# Patient Record
Sex: MALE | Race: WHITE | Employment: UNEMPLOYED | URBAN - METROPOLITAN AREA
[De-identification: names, ages, dates, MRNs, and addresses within clinical notes are randomized per-mention and may not be internally consistent; named-entity substitution may affect disease eponyms.]

---

## 2021-01-10 ENCOUNTER — HOSPITAL ENCOUNTER (EMERGENCY)
Dept: CT IMAGING | Age: 66
Discharge: HOME | DRG: 208 | End: 2021-01-10
Attending: EMERGENCY MEDICINE
Payer: MEDICARE

## 2021-01-10 ENCOUNTER — APPOINTMENT (OUTPATIENT)
Dept: GENERAL RADIOLOGY | Age: 66
DRG: 208 | End: 2021-01-10
Attending: EMERGENCY MEDICINE
Payer: MEDICARE

## 2021-01-10 ENCOUNTER — HOSPITAL ENCOUNTER (INPATIENT)
Age: 66
LOS: 3 days | Discharge: NURSING, REHAB OR LONG-TERM CARE | DRG: 208 | End: 2021-01-13
Attending: EMERGENCY MEDICINE | Admitting: INTERNAL MEDICINE
Payer: MEDICARE

## 2021-01-10 DIAGNOSIS — J96.02 ACUTE RESPIRATORY FAILURE WITH HYPERCAPNIA (HCC): ICD-10-CM

## 2021-01-10 DIAGNOSIS — G93.40 ACUTE ENCEPHALOPATHY: ICD-10-CM

## 2021-01-10 DIAGNOSIS — A41.9 SEPSIS, DUE TO UNSPECIFIED ORGANISM, UNSPECIFIED WHETHER ACUTE ORGAN DYSFUNCTION PRESENT (HCC): ICD-10-CM

## 2021-01-10 DIAGNOSIS — N30.00 ACUTE CYSTITIS WITHOUT HEMATURIA: Primary | ICD-10-CM

## 2021-01-10 LAB
ALBUMIN SERPL-MCNC: 3.2 G/DL (ref 3.5–5)
ALBUMIN/GLOB SERPL: 0.7 {RATIO} (ref 1.1–2.2)
ALP SERPL-CCNC: 110 U/L (ref 45–117)
ALT SERPL-CCNC: 26 U/L (ref 12–78)
ANION GAP SERPL CALC-SCNC: 5 MMOL/L (ref 5–15)
APPEARANCE UR: ABNORMAL
ARTERIAL PATENCY WRIST A: YES
AST SERPL-CCNC: 23 U/L (ref 15–37)
ATRIAL RATE: 102 BPM
BACTERIA URNS QL MICRO: ABNORMAL /HPF
BASE DEFICIT BLD-SCNC: 4 MMOL/L
BASOPHILS # BLD: 0 K/UL (ref 0–0.1)
BASOPHILS NFR BLD: 0 % (ref 0–1)
BDY SITE: ABNORMAL
BILIRUB SERPL-MCNC: 0.6 MG/DL (ref 0.2–1)
BILIRUB UR QL CFM: NEGATIVE
BUN SERPL-MCNC: 53 MG/DL (ref 6–20)
BUN/CREAT SERPL: 12 (ref 12–20)
CA-I BLD-SCNC: 1.39 MMOL/L (ref 1.12–1.32)
CALCIUM SERPL-MCNC: 9.7 MG/DL (ref 8.5–10.1)
CALCULATED R AXIS, ECG10: 3 DEGREES
CALCULATED T AXIS, ECG11: 137 DEGREES
CHLORIDE SERPL-SCNC: 97 MMOL/L (ref 97–108)
CO2 SERPL-SCNC: 26 MMOL/L (ref 21–32)
COLOR UR: ABNORMAL
COMMENT, HOLDF: NORMAL
COMMENT, HOLDF: NORMAL
COVID-19 RAPID TEST, COVR: NOT DETECTED
CREAT SERPL-MCNC: 4.48 MG/DL (ref 0.7–1.3)
DIAGNOSIS, 93000: NORMAL
DIFFERENTIAL METHOD BLD: ABNORMAL
EOSINOPHIL # BLD: 0 K/UL (ref 0–0.4)
EOSINOPHIL NFR BLD: 0 % (ref 0–7)
EPITH CASTS URNS QL MICRO: ABNORMAL /LPF
ERYTHROCYTE [DISTWIDTH] IN BLOOD BY AUTOMATED COUNT: 18.9 % (ref 11.5–14.5)
GAS FLOW.O2 O2 DELIVERY SYS: ABNORMAL L/MIN
GLOBULIN SER CALC-MCNC: 4.6 G/DL (ref 2–4)
GLUCOSE SERPL-MCNC: 281 MG/DL (ref 65–100)
GLUCOSE UR STRIP.AUTO-MCNC: NEGATIVE MG/DL
HCO3 BLD-SCNC: 23.5 MMOL/L (ref 22–26)
HCT VFR BLD AUTO: 29.5 % (ref 36.6–50.3)
HEALTH STATUS, XMCV2T: NORMAL
HGB BLD-MCNC: 8.7 G/DL (ref 12.1–17)
HGB UR QL STRIP: ABNORMAL
IMM GRANULOCYTES # BLD AUTO: 0.1 K/UL (ref 0–0.04)
IMM GRANULOCYTES NFR BLD AUTO: 1 % (ref 0–0.5)
KETONES UR QL STRIP.AUTO: NEGATIVE MG/DL
LACTATE SERPL-SCNC: 1.6 MMOL/L (ref 0.4–2)
LEUKOCYTE ESTERASE UR QL STRIP.AUTO: ABNORMAL
LYMPHOCYTES # BLD: 0.3 K/UL (ref 0.8–3.5)
LYMPHOCYTES NFR BLD: 2 % (ref 12–49)
MCH RBC QN AUTO: 26.7 PG (ref 26–34)
MCHC RBC AUTO-ENTMCNC: 29.5 G/DL (ref 30–36.5)
MCV RBC AUTO: 90.5 FL (ref 80–99)
MONOCYTES # BLD: 0.5 K/UL (ref 0–1)
MONOCYTES NFR BLD: 4 % (ref 5–13)
NEUTS SEG # BLD: 12.1 K/UL (ref 1.8–8)
NEUTS SEG NFR BLD: 93 % (ref 32–75)
NITRITE UR QL STRIP.AUTO: NEGATIVE
NRBC # BLD: 0.03 K/UL (ref 0–0.01)
NRBC BLD-RTO: 0.2 PER 100 WBC
O2/TOTAL GAS SETTING VFR VENT: 40 %
PCO2 BLD: 55.8 MMHG (ref 35–45)
PH BLD: 7.23 [PH] (ref 7.35–7.45)
PH UR STRIP: 5 [PH] (ref 5–8)
PLATELET # BLD AUTO: 222 K/UL (ref 150–400)
PMV BLD AUTO: 11.2 FL (ref 8.9–12.9)
PO2 BLD: 110 MMHG (ref 80–100)
POTASSIUM SERPL-SCNC: 5.1 MMOL/L (ref 3.5–5.1)
PROT SERPL-MCNC: 7.8 G/DL (ref 6.4–8.2)
PROT UR STRIP-MCNC: 300 MG/DL
Q-T INTERVAL, ECG07: 346 MS
QRS DURATION, ECG06: 120 MS
QTC CALCULATION (BEZET), ECG08: 454 MS
RBC # BLD AUTO: 3.26 M/UL (ref 4.1–5.7)
RBC #/AREA URNS HPF: ABNORMAL /HPF (ref 0–5)
RBC MORPH BLD: ABNORMAL
SAMPLES BEING HELD,HOLD: NORMAL
SAMPLES BEING HELD,HOLD: NORMAL
SAO2 % BLD: 97 % (ref 92–97)
SODIUM SERPL-SCNC: 128 MMOL/L (ref 136–145)
SOURCE, COVRS: NORMAL
SP GR UR REFRACTOMETRY: 1.02 (ref 1–1.03)
SPECIMEN SOURCE, FCOV2M: NORMAL
SPECIMEN TYPE, XMCV1T: NORMAL
SPECIMEN TYPE: ABNORMAL
TROPONIN I SERPL-MCNC: <0.05 NG/ML
UROBILINOGEN UR QL STRIP.AUTO: 0.2 EU/DL (ref 0.2–1)
VENTRICULAR RATE, ECG03: 104 BPM
WBC # BLD AUTO: 13 K/UL (ref 4.1–11.1)
WBC MORPH BLD: ABNORMAL
WBC URNS QL MICRO: ABNORMAL /HPF (ref 0–4)
YEAST BUDDING URNS QL: PRESENT

## 2021-01-10 PROCEDURE — 65610000006 HC RM INTENSIVE CARE

## 2021-01-10 PROCEDURE — 74176 CT ABD & PELVIS W/O CONTRAST: CPT

## 2021-01-10 PROCEDURE — 74011250636 HC RX REV CODE- 250/636: Performed by: INTERNAL MEDICINE

## 2021-01-10 PROCEDURE — 83605 ASSAY OF LACTIC ACID: CPT

## 2021-01-10 PROCEDURE — 80053 COMPREHEN METABOLIC PANEL: CPT

## 2021-01-10 PROCEDURE — 94002 VENT MGMT INPAT INIT DAY: CPT

## 2021-01-10 PROCEDURE — 87086 URINE CULTURE/COLONY COUNT: CPT

## 2021-01-10 PROCEDURE — 74011250637 HC RX REV CODE- 250/637: Performed by: INTERNAL MEDICINE

## 2021-01-10 PROCEDURE — 71250 CT THORAX DX C-: CPT

## 2021-01-10 PROCEDURE — 87506 IADNA-DNA/RNA PROBE TQ 6-11: CPT

## 2021-01-10 PROCEDURE — 84484 ASSAY OF TROPONIN QUANT: CPT

## 2021-01-10 PROCEDURE — 96368 THER/DIAG CONCURRENT INF: CPT

## 2021-01-10 PROCEDURE — 71045 X-RAY EXAM CHEST 1 VIEW: CPT

## 2021-01-10 PROCEDURE — 77030020291 HC FLEXSEAL FMS BMS -C

## 2021-01-10 PROCEDURE — 96365 THER/PROPH/DIAG IV INF INIT: CPT

## 2021-01-10 PROCEDURE — 87177 OVA AND PARASITES SMEARS: CPT

## 2021-01-10 PROCEDURE — 99285 EMERGENCY DEPT VISIT HI MDM: CPT

## 2021-01-10 PROCEDURE — 93005 ELECTROCARDIOGRAM TRACING: CPT

## 2021-01-10 PROCEDURE — 87324 CLOSTRIDIUM AG IA: CPT

## 2021-01-10 PROCEDURE — 74011250637 HC RX REV CODE- 250/637: Performed by: EMERGENCY MEDICINE

## 2021-01-10 PROCEDURE — 87106 FUNGI IDENTIFICATION YEAST: CPT

## 2021-01-10 PROCEDURE — 87635 SARS-COV-2 COVID-19 AMP PRB: CPT

## 2021-01-10 PROCEDURE — 77030037878 HC DRSG MEPILEX >48IN BORD MOLN -B

## 2021-01-10 PROCEDURE — 74011000258 HC RX REV CODE- 258: Performed by: EMERGENCY MEDICINE

## 2021-01-10 PROCEDURE — 5A1945Z RESPIRATORY VENTILATION, 24-96 CONSECUTIVE HOURS: ICD-10-PCS | Performed by: INTERNAL MEDICINE

## 2021-01-10 PROCEDURE — 31502 CHANGE OF WINDPIPE AIRWAY: CPT

## 2021-01-10 PROCEDURE — 70450 CT HEAD/BRAIN W/O DYE: CPT

## 2021-01-10 PROCEDURE — 82803 BLOOD GASES ANY COMBINATION: CPT

## 2021-01-10 PROCEDURE — 87040 BLOOD CULTURE FOR BACTERIA: CPT

## 2021-01-10 PROCEDURE — 85025 COMPLETE CBC W/AUTO DIFF WBC: CPT

## 2021-01-10 PROCEDURE — 74011250636 HC RX REV CODE- 250/636: Performed by: EMERGENCY MEDICINE

## 2021-01-10 PROCEDURE — 36600 WITHDRAWAL OF ARTERIAL BLOOD: CPT

## 2021-01-10 PROCEDURE — 36415 COLL VENOUS BLD VENIPUNCTURE: CPT

## 2021-01-10 PROCEDURE — 81001 URINALYSIS AUTO W/SCOPE: CPT

## 2021-01-10 RX ORDER — IPRATROPIUM BROMIDE AND ALBUTEROL SULFATE 2.5; .5 MG/3ML; MG/3ML
3 SOLUTION RESPIRATORY (INHALATION) 2 TIMES DAILY
COMMUNITY

## 2021-01-10 RX ORDER — PROMETHAZINE HYDROCHLORIDE 25 MG/1
12.5 TABLET ORAL
Status: DISCONTINUED | OUTPATIENT
Start: 2021-01-10 | End: 2021-01-13 | Stop reason: HOSPADM

## 2021-01-10 RX ORDER — ATORVASTATIN CALCIUM 40 MG/1
40 TABLET, FILM COATED ORAL
COMMUNITY

## 2021-01-10 RX ORDER — CHLORHEXIDINE GLUCONATE 1.2 MG/ML
15 RINSE ORAL
COMMUNITY

## 2021-01-10 RX ORDER — METOPROLOL TARTRATE 100 MG/1
100 TABLET ORAL 2 TIMES DAILY
COMMUNITY

## 2021-01-10 RX ORDER — FLUCONAZOLE 100 MG/1
100 TABLET ORAL DAILY
COMMUNITY

## 2021-01-10 RX ORDER — MELATONIN
2000 DAILY
COMMUNITY

## 2021-01-10 RX ORDER — SODIUM CHLORIDE 0.9 % (FLUSH) 0.9 %
5-40 SYRINGE (ML) INJECTION EVERY 8 HOURS
Status: DISCONTINUED | OUTPATIENT
Start: 2021-01-10 | End: 2021-01-13 | Stop reason: HOSPADM

## 2021-01-10 RX ORDER — B COMPLEX C NO.10/FOLIC ACID 900MCG/5ML
5 LIQUID (ML) ORAL
COMMUNITY

## 2021-01-10 RX ORDER — ACETAMINOPHEN 325 MG/1
650 TABLET ORAL
COMMUNITY

## 2021-01-10 RX ORDER — DARBEPOETIN ALFA 60 UG/.3ML
60 INJECTION, SOLUTION INTRAVENOUS; SUBCUTANEOUS
COMMUNITY

## 2021-01-10 RX ORDER — ALBUTEROL SULFATE 0.83 MG/ML
2.5 SOLUTION RESPIRATORY (INHALATION)
COMMUNITY

## 2021-01-10 RX ORDER — SODIUM CHLORIDE, SODIUM LACTATE, POTASSIUM CHLORIDE, CALCIUM CHLORIDE 600; 310; 30; 20 MG/100ML; MG/100ML; MG/100ML; MG/100ML
75 INJECTION, SOLUTION INTRAVENOUS CONTINUOUS
Status: DISCONTINUED | OUTPATIENT
Start: 2021-01-10 | End: 2021-01-13

## 2021-01-10 RX ORDER — ENOXAPARIN SODIUM 100 MG/ML
30 INJECTION SUBCUTANEOUS DAILY
Status: DISCONTINUED | OUTPATIENT
Start: 2021-01-11 | End: 2021-01-12

## 2021-01-10 RX ORDER — LEVOFLOXACIN 5 MG/ML
750 INJECTION, SOLUTION INTRAVENOUS ONCE
Status: COMPLETED | OUTPATIENT
Start: 2021-01-10 | End: 2021-01-10

## 2021-01-10 RX ORDER — GABAPENTIN 100 MG/1
100 CAPSULE ORAL 3 TIMES DAILY
COMMUNITY

## 2021-01-10 RX ORDER — ACETAMINOPHEN 650 MG/1
SUPPOSITORY RECTAL
Status: DISPENSED
Start: 2021-01-10 | End: 2021-01-11

## 2021-01-10 RX ORDER — ESCITALOPRAM OXALATE 10 MG/1
10 TABLET ORAL DAILY
COMMUNITY

## 2021-01-10 RX ORDER — AMIODARONE HYDROCHLORIDE 200 MG/1
200 TABLET ORAL 2 TIMES DAILY
COMMUNITY

## 2021-01-10 RX ORDER — INSULIN GLARGINE 100 [IU]/ML
30 INJECTION, SOLUTION SUBCUTANEOUS EVERY 12 HOURS
COMMUNITY

## 2021-01-10 RX ORDER — ONDANSETRON 2 MG/ML
4 INJECTION INTRAMUSCULAR; INTRAVENOUS
Status: DISCONTINUED | OUTPATIENT
Start: 2021-01-10 | End: 2021-01-13 | Stop reason: HOSPADM

## 2021-01-10 RX ORDER — PANTOPRAZOLE SODIUM 40 MG/1
40 TABLET, DELAYED RELEASE ORAL DAILY
COMMUNITY

## 2021-01-10 RX ORDER — ACETAMINOPHEN 325 MG/1
650 TABLET ORAL
Status: DISCONTINUED | OUTPATIENT
Start: 2021-01-10 | End: 2021-01-13 | Stop reason: HOSPADM

## 2021-01-10 RX ORDER — ACETAMINOPHEN 650 MG/1
650 SUPPOSITORY RECTAL
Status: COMPLETED | OUTPATIENT
Start: 2021-01-10 | End: 2021-01-10

## 2021-01-10 RX ORDER — POLYETHYLENE GLYCOL 3350 17 G/17G
17 POWDER, FOR SOLUTION ORAL DAILY PRN
Status: DISCONTINUED | OUTPATIENT
Start: 2021-01-10 | End: 2021-01-13 | Stop reason: HOSPADM

## 2021-01-10 RX ORDER — MIDODRINE HYDROCHLORIDE 10 MG/1
TABLET ORAL
COMMUNITY

## 2021-01-10 RX ORDER — SODIUM CHLORIDE 0.9 % (FLUSH) 0.9 %
5-40 SYRINGE (ML) INJECTION AS NEEDED
Status: DISCONTINUED | OUTPATIENT
Start: 2021-01-10 | End: 2021-01-13 | Stop reason: HOSPADM

## 2021-01-10 RX ORDER — CLONAZEPAM 0.5 MG/1
0.5 TABLET ORAL EVERY 8 HOURS
COMMUNITY

## 2021-01-10 RX ORDER — ACETAMINOPHEN 650 MG/1
650 SUPPOSITORY RECTAL
Status: DISCONTINUED | OUTPATIENT
Start: 2021-01-10 | End: 2021-01-13 | Stop reason: HOSPADM

## 2021-01-10 RX ORDER — INSULIN LISPRO 100 [IU]/ML
1 INJECTION, SOLUTION INTRAVENOUS; SUBCUTANEOUS
COMMUNITY

## 2021-01-10 RX ADMIN — SODIUM CHLORIDE 1000 ML: 9 INJECTION, SOLUTION INTRAVENOUS at 08:15

## 2021-01-10 RX ADMIN — ACETAMINOPHEN 650 MG: 650 SUPPOSITORY RECTAL at 12:26

## 2021-01-10 RX ADMIN — ACETAMINOPHEN 650 MG: 650 SUPPOSITORY RECTAL at 18:29

## 2021-01-10 RX ADMIN — CEFEPIME HYDROCHLORIDE 2 G: 2 INJECTION, POWDER, FOR SOLUTION INTRAVENOUS at 10:23

## 2021-01-10 RX ADMIN — LEVOFLOXACIN 750 MG: 5 INJECTION, SOLUTION INTRAVENOUS at 12:09

## 2021-01-10 RX ADMIN — SODIUM CHLORIDE 1000 ML: 9 INJECTION, SOLUTION INTRAVENOUS at 12:26

## 2021-01-10 RX ADMIN — SODIUM CHLORIDE, POTASSIUM CHLORIDE, SODIUM LACTATE AND CALCIUM CHLORIDE 75 ML/HR: 600; 310; 30; 20 INJECTION, SOLUTION INTRAVENOUS at 15:08

## 2021-01-10 RX ADMIN — Medication 10 ML: at 15:08

## 2021-01-10 NOTE — PROGRESS NOTES
Admission Medication Reconciliation:    Information obtained from: chart review, medical records from [address] [place] [address]:  YES- not used     Comments/Recommendations: All medications/allergies have been reviewed and updated; all medications were reviewed from medical records sent from [address] [place] [address]. Medication active from 21 were included in medication list.     Changes made to Prior to Admission (PTA) Medication List:   ?   Medications Added:   - All current medication on PTA medication list   ?   Medications Changed:   - None   ? Medications Removed:   - None     ¹RxQuery pharmacy benefit data reflects medications filled and processed through the patient's insurance, however   this data does NOT capture whether the medication was picked up or is currently being taken by the patient. Allergies:  Codeine and Penciclovir    Significant PMH/Disease States:   History reviewed. No pertinent past medical history. Chief Complaint for this Admission:    Chief Complaint   Patient presents with    Unresponsive       Prior to Admission Medications:   Prior to Admission Medications   Prescriptions Last Dose Informant Patient Reported? Taking?   acetaminophen (TYLENOL) 325 mg tablet   Yes Yes   Sig: Take 650 mg by mouth every six (6) hours as needed for Pain. albuterol (PROVENTIL VENTOLIN) 2.5 mg /3 mL (0.083 %) nebu   Yes Yes   Si.5 mg by Nebulization route every six (6) hours as needed for Wheezing. albuterol-ipratropium (DUO-NEB) 2.5 mg-0.5 mg/3 ml nebu   Yes Yes   Sig: 3 mL by Nebulization route two (2) times a day. amiodarone (CORDARONE) 200 mg tablet   Yes Yes   Sig: Take 200 mg by mouth two (2) times a day. apixaban (ELIQUIS) 5 mg tablet   Yes Yes   Sig: Take 5 mg by mouth two (2) times a day. atorvastatin (LIPITOR) 40 mg tablet   Yes Yes   Sig: Take 40 mg by mouth nightly. b complex-vitamin c-folic acid (Nephronex) 900 mcg/5 mL liqd   Yes Yes   Sig: Take 5 mL by mouth nightly. chlorhexidine (Paroex Oral Rinse) 0.12 % solution   Yes Yes   Sig: 15 mL by Swish and Spit route two (2) times daily as needed. cholecalciferol (Vitamin D3) (1000 Units /25 mcg) tablet   Yes Yes   Sig: Take 2,000 Units by mouth daily. clonazePAM (KlonoPIN) 0.5 mg tablet   Yes Yes   Sig: Take 0.5 mg by mouth every eight (8) hours. darbepoetin chucho (Aranesp, Polysorbate,) 60 mcg/0.3 mL injection   Yes Yes   Si mcg by SubCUTAneous route Every Thursday. escitalopram oxalate (Lexapro) 10 mg tablet   Yes Yes   Sig: Take 10 mg by mouth daily. fluconazole (Diflucan) 100 mg tablet   Yes Yes   Sig: Take 100 mg by mouth daily. FDA advises cautious prescribing of oral fluconazole in pregnancy. gabapentin (NEURONTIN) 100 mg capsule   Yes Yes   Sig: Take 100 mg by mouth three (3) times daily. insulin glargine (Lantus U-100 Insulin) 100 unit/mL injection   Yes Yes   Si Units by SubCUTAneous route every twelve (12) hours. insulin lispro (HumaLOG U-100 Insulin) 100 unit/mL injection   Yes Yes   Si Units by SubCUTAneous route three (3) times daily as needed (meals). metoprolol tartrate (Lopressor) 100 mg IR tablet   Yes Yes   Sig: Take 100 mg by mouth two (2) times a day. midodrine (PROAMATINE) 10 mg tablet   Yes Yes   Sig: Take  by mouth Every Tuesday, Thursday and Saturday. pantoprazole (Protonix) 40 mg tablet   Yes Yes   Sig: Take 40 mg by mouth daily. polyvinyl alcohol-povidon,PF, (REFRESH CLASSIC) 1.4-0.6 % ophthalmic solution   Yes Yes   Sig: Administer 1 Drop to both eyes every six (6) hours as needed. Facility-Administered Medications: None         Thank you for allowing pharmacy to participate in the coordination of this patient's care. If you have any other questions, please contact the medication reconciliation pharmacist at [phone]. Anibal Walters D., [facility]

## 2021-01-10 NOTE — PROGRESS NOTES
Day #1 of cefepime  Indication:  UTI  Current regimen:  1g IV Q12h  Abx regimen: cefepime  Recent Labs     01/10/21  0717   WBC 13.0*   CREA 4.48*   BUN 53*     Est CrCl: ~18 ml/min  Temp (24hrs), Av.5 °F (39.2 °C), Min:101.9 °F (38.8 °C), Max:103 °F (39.4 °C)    Cultures: 1/10 blood - pending    Plan: Change to cefepime 1g IV Q24h per renal dose adjustment policy.

## 2021-01-10 NOTE — ED TRIAGE NOTES
EMS reports the pt was sent from [facility] for low SPO2 sat (in the low 80s). EMS reports the pt is normally vented and when they suctioned him his sats came up. Leslie North reports after suctioning the pt became unresponsive. EMS reports the pt normally does not talk but does respond. EMS reports they did not put the pt back on the vent because he was breathing on his own and sating fine.

## 2021-01-10 NOTE — H&P
[facility]    ICU Team H&P     Name: Jesus Paige   : 1955   MRN: [id number]   Date: 1/10/2021      A/P   1. UTI with sepsis    A. Cefepime 2gm q12 d/c FQ   B narrow when cx returned     2. DMII - SSI     3. CKI IV with LYDIA    A. Trend CR     4. Chronic resp failure - baseline     5. Afib - get med list from [address] [place] [address] and restart meds as needed     6. PUI per hospital policy - no medical indication     POD:* No surgery found *      Problem List  Never Reviewed          Codes Class    Sepsis ([facility] [address] ICD-10-CM: A41.9  ICD-9-CM: 038.9, 995.91               Past Medical History:   Covid PNA with trach at [address] [place] [address]   CKD IV   DMII  Afib  Debilitatin   Anoxic brain injury - chronic - able to communicate needs     Past Surgical History:      has no past surgical history on file. Home Medications:     Prior to Admission medications    Not on File       Allergies/Social/Family History:     No Known Allergies   Social History     Tobacco Use    Smoking status: Not on file   Substance Use Topics    Alcohol use: Not on file      History reviewed. No pertinent family history. Review of Systems:     Unable     Objective:   Vital Signs:  Visit Vitals  /63 (BP 1 Location: Left arm, BP Patient Position: Head of bed elevated (Comment degrees))   Pulse (!) 113   Temp (!) 102.9 °F (39.4 °C)   Resp 20   Wt 82.5 kg (181 lb 14.1 oz)   SpO2 99%      O2 Device: Tracheostomy, Ventilator Temp (24hrs), Av °F (39.4 °C), Min:102.9 °F (39.4 °C), Max:103 °F (39.4 °C)           Intake/Output:   No intake or output data in the 24 hours ending 01/10/21 1245    Physical Exam:    General:  Opens eyes tracks does not follow commands - better than he was    Eyes:  Sclera anicteric. Pupils equal, round, reactive to light. Mouth/Throat: Orotracheal tube in place. Neck: Supple. Lungs:   Clear to auscultation bilaterally, good effort. Cardiovascular:  Regular rate and rhythm, no murmur, click, rub, or gallop.    Abdomen: Soft, non-tender, bowel sounds normal, non-distended. Extremities: No cyanosis or edema. Skin: No acute rash or lesions. Lymph Nodes: Cervical and supraclavicular normal.   Musculoskeletal:  No swelling or deformity. Lines/Devices:  Intact, no erythema, drainage, or tenderness. LABS AND  DATA: Personally reviewed  Recent Labs     01/10/21  0717   WBC 13.0*   HGB 8.7*   HCT 29.5*        Recent Labs     01/10/21  0717   *   K 5.1   CL 97   CO2 26   BUN 53*   CREA 4.48*   *   CA 9.7     Recent Labs     01/10/21  0717      TP 7.8   ALB 3.2*   GLOB 4.6*     No results for input(s): INR, PTP, APTT, INREXT in the last 72 hours. Recent Labs     01/10/21  0726   PHI 7.23*   PCO2I 55.8*   PO2I 110*   FIO2I 40     Recent Labs     01/10/21  0717   TROIQ <0.05       Hemodynamics:   PAP:   CO:     Wedge:   CI:     CVP:    SVR:       PVR:       Ventilator Settings:  Mode Rate Tidal Volume Pressure FiO2 PEEP   Assist control   500 ml    40 % 5 cm H20     Peak airway pressure: 30 cm H2O    Minute ventilation: 10.3 l/min        MEDS: Reviewed    Chest X-Ray: personally reviewed and report checked      DISPOSITION  Stay in ICU    CRITICAL CARE     I personally spent 65 minutes of critical care time. This is time spent at this critically ill patient's bedside actively involved in patient care as well as the coordination of care and discussions with the patient's family. This does not include any procedural time which has been billed separately.     [address] [place] [address]  1/10/2021

## 2021-01-10 NOTE — PROGRESS NOTES
Renal Dosing/Monitoring  Medication: famotidine   Current regimen:  20 mg IV every 12 hr  Recent Labs     01/10/21  0717   CREA 4.48*   BUN 53*     Estimated CrCl:  ~18 ml/min  Plan: Change to famotidine 20 mg IV P11P  per policy.

## 2021-01-10 NOTE — ED NOTES
Pts wife, Amberly Sy, [phone]. Pts wife states [facility] informed her that pt spiked a fever and had retained too much CO2 which is why he was sent to the hospital today. She reports the pt is usually responsive, has a hx of stage 4 renal disease, diabetes, and afib. Pts wife states they informed her that the pt had a UTI on Wednesday and pts wife states the pt looked unwell since Friday.

## 2021-01-11 LAB
ALBUMIN SERPL-MCNC: 2.6 G/DL (ref 3.5–5)
ALBUMIN/GLOB SERPL: 0.7 {RATIO} (ref 1.1–2.2)
ALP SERPL-CCNC: 87 U/L (ref 45–117)
ALT SERPL-CCNC: 31 U/L (ref 12–78)
ANION GAP SERPL CALC-SCNC: 8 MMOL/L (ref 5–15)
ARTERIAL PATENCY WRIST A: NO
AST SERPL-CCNC: 38 U/L (ref 15–37)
BASE DEFICIT BLD-SCNC: 6 MMOL/L
BASOPHILS # BLD: 0 K/UL (ref 0–0.1)
BASOPHILS NFR BLD: 0 % (ref 0–1)
BDY SITE: ABNORMAL
BILIRUB SERPL-MCNC: 0.5 MG/DL (ref 0.2–1)
BUN SERPL-MCNC: 76 MG/DL (ref 6–20)
BUN/CREAT SERPL: 14 (ref 12–20)
C DIFF GDH STL QL: NEGATIVE
C DIFF TOX A+B STL QL IA: NEGATIVE
C DIFF TOX GENS STL QL NAA+PROBE: NORMAL
CA-I BLD-SCNC: 1.34 MMOL/L (ref 1.12–1.32)
CALCIUM SERPL-MCNC: 9.3 MG/DL (ref 8.5–10.1)
CAMPYLOBACTER SPECIES, DNA: NEGATIVE
CHLORIDE SERPL-SCNC: 103 MMOL/L (ref 97–108)
CO2 SERPL-SCNC: 22 MMOL/L (ref 21–32)
COMMENT, HOLDF: NORMAL
CREAT SERPL-MCNC: 5.29 MG/DL (ref 0.7–1.3)
DIFFERENTIAL METHOD BLD: ABNORMAL
ENTEROTOXIGEN E COLI, DNA: NEGATIVE
EOSINOPHIL # BLD: 0 K/UL (ref 0–0.4)
EOSINOPHIL NFR BLD: 0 % (ref 0–7)
ERYTHROCYTE [DISTWIDTH] IN BLOOD BY AUTOMATED COUNT: 18.8 % (ref 11.5–14.5)
GAS FLOW.O2 O2 DELIVERY SYS: ABNORMAL L/MIN
GAS FLOW.O2 SETTING OXYMISER: 16 BPM
GLOBULIN SER CALC-MCNC: 3.6 G/DL (ref 2–4)
GLUCOSE SERPL-MCNC: 122 MG/DL (ref 65–100)
HCO3 BLD-SCNC: 21 MMOL/L (ref 22–26)
HCT VFR BLD AUTO: 24.5 % (ref 36.6–50.3)
HGB BLD-MCNC: 7.2 G/DL (ref 12.1–17)
IMM GRANULOCYTES # BLD AUTO: 0.1 K/UL (ref 0–0.04)
IMM GRANULOCYTES NFR BLD AUTO: 1 % (ref 0–0.5)
INTERPRETATION: NORMAL
LACTATE SERPL-SCNC: 1.4 MMOL/L (ref 0.4–2)
LYMPHOCYTES # BLD: 0.5 K/UL (ref 0.8–3.5)
LYMPHOCYTES NFR BLD: 9 % (ref 12–49)
MCH RBC QN AUTO: 26.1 PG (ref 26–34)
MCHC RBC AUTO-ENTMCNC: 29.4 G/DL (ref 30–36.5)
MCV RBC AUTO: 88.8 FL (ref 80–99)
MONOCYTES # BLD: 0.4 K/UL (ref 0–1)
MONOCYTES NFR BLD: 7 % (ref 5–13)
NEUTS SEG # BLD: 5.1 K/UL (ref 1.8–8)
NEUTS SEG NFR BLD: 83 % (ref 32–75)
NRBC # BLD: 0 K/UL (ref 0–0.01)
NRBC BLD-RTO: 0 PER 100 WBC
O2/TOTAL GAS SETTING VFR VENT: 0.35 %
P SHIGELLOIDES DNA STL QL NAA+PROBE: NEGATIVE
PCO2 BLD: 46.8 MMHG (ref 35–45)
PEEP RESPIRATORY: 8 CMH2O
PH BLD: 7.26 [PH] (ref 7.35–7.45)
PIP ISTAT,IPIP: 28
PLATELET # BLD AUTO: 130 K/UL (ref 150–400)
PMV BLD AUTO: 10 FL (ref 8.9–12.9)
PO2 BLD: 45 MMHG (ref 80–100)
POTASSIUM SERPL-SCNC: 5 MMOL/L (ref 3.5–5.1)
PROT SERPL-MCNC: 6.2 G/DL (ref 6.4–8.2)
RBC # BLD AUTO: 2.76 M/UL (ref 4.1–5.7)
RBC MORPH BLD: ABNORMAL
SALMONELLA SPECIES, DNA: NEGATIVE
SAMPLES BEING HELD,HOLD: NORMAL
SAO2 % BLD: 74 % (ref 92–97)
SHIGA TOXIN PRODUCING, DNA: NEGATIVE
SHIGELLA SP+EIEC IPAH STL QL NAA+PROBE: NEGATIVE
SODIUM SERPL-SCNC: 133 MMOL/L (ref 136–145)
SPECIMEN TYPE: ABNORMAL
TOTAL RESP. RATE, ITRR: 16
VENTILATION MODE VENT: ABNORMAL
VIBRIO SPECIES, DNA: NEGATIVE
VT SETTING VENT: 500 ML
WBC # BLD AUTO: 6.1 K/UL (ref 4.1–11.1)
Y. ENTEROCOLITICA, DNA: NEGATIVE

## 2021-01-11 PROCEDURE — 65610000006 HC RM INTENSIVE CARE

## 2021-01-11 PROCEDURE — 74011000250 HC RX REV CODE- 250: Performed by: INTERNAL MEDICINE

## 2021-01-11 PROCEDURE — 74011000258 HC RX REV CODE- 258: Performed by: INTERNAL MEDICINE

## 2021-01-11 PROCEDURE — 74011250636 HC RX REV CODE- 250/636: Performed by: INTERNAL MEDICINE

## 2021-01-11 PROCEDURE — 77030018798 HC PMP KT ENTRL FED COVD -A

## 2021-01-11 PROCEDURE — 94003 VENT MGMT INPAT SUBQ DAY: CPT

## 2021-01-11 PROCEDURE — 36415 COLL VENOUS BLD VENIPUNCTURE: CPT

## 2021-01-11 PROCEDURE — 74011250637 HC RX REV CODE- 250/637: Performed by: NURSE PRACTITIONER

## 2021-01-11 PROCEDURE — 87635 SARS-COV-2 COVID-19 AMP PRB: CPT

## 2021-01-11 PROCEDURE — 83605 ASSAY OF LACTIC ACID: CPT

## 2021-01-11 PROCEDURE — 87070 CULTURE OTHR SPECIMN AEROBIC: CPT

## 2021-01-11 PROCEDURE — 82803 BLOOD GASES ANY COMBINATION: CPT

## 2021-01-11 PROCEDURE — 77030037877 HC DRSG MEPILEX >48IN BORD MOLN -A

## 2021-01-11 PROCEDURE — 85025 COMPLETE CBC W/AUTO DIFF WBC: CPT

## 2021-01-11 PROCEDURE — 94762 N-INVAS EAR/PLS OXIMTRY CONT: CPT

## 2021-01-11 PROCEDURE — 77030041076 HC DRSG AG OPTICELL MDII -A

## 2021-01-11 PROCEDURE — 80053 COMPREHEN METABOLIC PANEL: CPT

## 2021-01-11 PROCEDURE — 77030011256 HC DRSG MEPILEX <16IN NO BORD MOLN -A

## 2021-01-11 RX ORDER — ALBUMIN HUMAN 250 G/1000ML
12.5 SOLUTION INTRAVENOUS
Status: DISCONTINUED | OUTPATIENT
Start: 2021-01-11 | End: 2021-01-13 | Stop reason: HOSPADM

## 2021-01-11 RX ADMIN — Medication 10 ML: at 14:36

## 2021-01-11 RX ADMIN — ENOXAPARIN SODIUM 30 MG: 30 INJECTION SUBCUTANEOUS at 09:32

## 2021-01-11 RX ADMIN — ACETAMINOPHEN ORAL SOLUTION 650 MG: 650 SOLUTION ORAL at 09:32

## 2021-01-11 RX ADMIN — CEFEPIME HYDROCHLORIDE 1 G: 1 INJECTION, POWDER, FOR SOLUTION INTRAMUSCULAR; INTRAVENOUS at 09:31

## 2021-01-11 RX ADMIN — FAMOTIDINE 20 MG: 10 INJECTION, SOLUTION INTRAVENOUS at 09:32

## 2021-01-11 RX ADMIN — COLLAGENASE SANTYL: 250 OINTMENT TOPICAL at 11:38

## 2021-01-11 NOTE — ROUTINE PROCESS
TRANSFER - OUT REPORT: 
 
Verbal report given to Kemi Correia (name) on Mitra Crowley  being transferred to CCU (unit) for routine progression of care Report consisted of patients Situation, Background, Assessment and  
Recommendations(SBAR). Information from the following report(s) SBAR, ED Summary and Recent Results was reviewed with the receiving nurse. Lines:  
Peripheral IV 01/10/21 Left;Upper Arm (Active) Opportunity for questions and clarification was provided. Patient transported with: 
 Monitor Registered Nurse Respiratory Vent

## 2021-01-11 NOTE — PROGRESS NOTES
[facility]    ICU TEAM Progress Note    Name: Bony Alston   : 1955   MRN: [id number]   Date: 2021      I  Subjective:   Progress Note: 2021      Reason for ICU Admission: Sepsis, acute on chronic hypercapnic respiratory failure    Interval history: This is a 79-year-old gentleman who had COVID-19 pneumonia in 2020 with prolonged hospitalization led to anoxic brain injury and renal failure. He was residing in [facility] when they noticed that he had altered mental status and they sent him to the ER. In the ER he was found to be hypercapnic and placed on a ventilator via tracheostomy tube and he was started on cefepime for possible urinary tract infection. Overnight Events:   No acute event overnight, reported to have better mental status and he is back to baseline, he is maintaining eye contact not appropriately at times. No fever no nausea no vomiting blood pressure maintained. Active Problem List:     Problem List  Never Reviewed          Codes Class    [facility] ICD-10-CM: A41.9  ICD-9-CM: 038.9, 995.91               Past Medical History:      has no past medical history on file. Past Surgical History:      has no past surgical history on file. Home Medications:     Prior to Admission medications    Medication Sig Start Date End Date Taking? Authorizing Provider   polyvinyl alcohol-KITTY fitzgerald, (REFRESH CLASSIC) 1.4-0.6 % ophthalmic solution Administer 1 Drop to both eyes every six (6) hours as needed. Yes Provider, Historical   atorvastatin (LIPITOR) 40 mg tablet Take 40 mg by mouth nightly. Yes Provider, Historical   cholecalciferol (Vitamin D3) (1000 Units /25 mcg) tablet Take 2,000 Units by mouth daily. Yes Provider, Historical   gabapentin (NEURONTIN) 100 mg capsule Take 100 mg by mouth three (3) times daily. Yes Provider, Historical   b complex-vitamin c-folic acid (Nephronex) 900 mcg/5 mL liqd Take 5 mL by mouth nightly. Yes Provider, Historical   chlorhexidine (Paroex Oral Rinse) 0.12 % solution 15 mL by Swish and Spit route two (2) times daily as needed. Yes Provider, Historical   fluconazole (Diflucan) 100 mg tablet Take 100 mg by mouth daily. FDA advises cautious prescribing of oral fluconazole in pregnancy. Yes Provider, Historical   acetaminophen (TYLENOL) 325 mg tablet Take 650 mg by mouth every six (6) hours as needed for Pain. Yes Provider, Historical   amiodarone (CORDARONE) 200 mg tablet Take 200 mg by mouth two (2) times a day. Yes Provider, Historical   apixaban (ELIQUIS) 5 mg tablet Take 5 mg by mouth two (2) times a day. Yes Provider, Historical   clonazePAM (KlonoPIN) 0.5 mg tablet Take 0.5 mg by mouth every eight (8) hours. Yes Provider, Historical   albuterol-ipratropium (DUO-NEB) 2.5 mg-0.5 mg/3 ml nebu 3 mL by Nebulization route two (2) times a day. Yes Provider, Historical   midodrine (PROAMATINE) 10 mg tablet Take  by mouth Every Tuesday, Thursday and Saturday. Yes Provider, Historical   insulin lispro (HumaLOG U-100 Insulin) 100 unit/mL injection 1 Units by SubCUTAneous route three (3) times daily as needed (meals). Yes Provider, Historical   pantoprazole (Protonix) 40 mg tablet Take 40 mg by mouth daily. Yes Provider, Historical   albuterol (PROVENTIL VENTOLIN) 2.5 mg /3 mL (0.083 %) nebu 2.5 mg by Nebulization route every six (6) hours as needed for Wheezing. Yes Provider, Historical   metoprolol tartrate (Lopressor) 100 mg IR tablet Take 100 mg by mouth two (2) times a day. Yes Provider, Historical   escitalopram oxalate (Lexapro) 10 mg tablet Take 10 mg by mouth daily. Yes Provider, Historical   darbepoetin chucho (Aranesp, Polysorbate,) 60 mcg/0.3 mL injection 60 mcg by SubCUTAneous route Every Thursday. Yes Provider, Historical   insulin glargine (Lantus U-100 Insulin) 100 unit/mL injection 30 Units by SubCUTAneous route every twelve (12) hours.    Yes Provider, Historical Allergies/Social/Family History: Allergies   Allergen Reactions    Codeine Nausea Only    Penciclovir Unknown (comments)      Social History     Tobacco Use    Smoking status: Not on file   Substance Use Topics    Alcohol use: Not on file      History reviewed. No pertinent family history. Review of Systems:     Not able to obtain due to his medical condition    Objective:   Vital Signs:  Visit Vitals  BP (!) 124/47   Pulse (!) 115   Temp (!) 101.1 °F (38.4 °C)   Resp 18   Ht 5' 10\" (1.778 m)   Wt 87.7 kg (193 lb 5.5 oz)   SpO2 99%   BMI 27.74 kg/m²      O2 Device: Tracheostomy, Ventilator Temp (24hrs), Av.3 °F (39.1 °C), Min:101.1 °F (38.4 °C), Max:103 °F (39.4 °C)           Intake/Output:     Intake/Output Summary (Last 24 hours) at 2021 0741  Last data filed at 1/10/2021 1940  Gross per 24 hour   Intake    Output 400 ml   Net -400 ml       Physical Exam:    General:  Alert, open eyes, maintain eye contact, he nods occasionally occasionally. On mechanical ventilation   Eyes:  Sclera anicteric. Pupils equally round and reactive to light. Mouth/Throat: Mucous membranes normal, oral pharynx clear   Neck: Supple, tracheostomy tube in place   Lungs:   Clear to auscultation bilaterally, good effort   CV:  Regular rate and rhythm,no murmur, click, rub or gallop   Abdomen:   Soft, non-tender.  bowel sounds normal. non-distended   Extremities: No cyanosis or edema   Skin:  Large sacral pressure ulcer [present on admission]   Lymph nodes: Cervical and supraclavicular normal   Musculoskeletal: No swelling or deformity   Lines/Devices:  Intact, no erythema, drainage or tenderness   Psych: Alert, history of anoxic brain injury       LABS AND  DATA: Personally reviewed  Recent Labs     21  0652 01/10/21  0717   WBC 6.1 13.0*   HGB 7.2* 8.7*   HCT 24.5* 29.5*   * 222     Recent Labs     21  0652 01/10/21  0717   * 128*   K 5.0 5.1    97   CO2 22 26   BUN 76* 53*   CREA 5.29* 4.48*   * 281*   CA 9.3 9.7     Recent Labs     01/11/21  0652 01/10/21  0717   AP 87 110   TP 6.2* 7.8   ALB 2.6* 3.2*   GLOB 3.6 4.6*     No results for input(s): INR, PTP, APTT, INREXT in the last 72 hours. Recent Labs     01/10/21  0726   PHI 7.23*   PCO2I 55.8*   PO2I 110*   FIO2I 40     Recent Labs     01/10/21  0717   TROIQ <0.05       Hemodynamics:   PAP:   CO:     Wedge:   CI:     CVP:    SVR:       PVR:       Ventilator Settings:  Mode Rate Tidal Volume Pressure FiO2 PEEP   Assist control, Volume control   500 ml    35 % 8 cm H20     Peak airway pressure: 28 cm H2O    Minute ventilation: 9.4 l/min        MEDS: Reviewed    Chest X-Ray:  CXR Results  (Last 48 hours)               01/10/21 0758  XR CHEST PORT Final result    Impression:  IMPRESSION: No acute findings. Narrative:  EXAM: XR CHEST PORT       INDICATION: Chest Pain       COMPARISON: None. FINDINGS: A portable AP radiograph of the chest was obtained at 07:56 hours. The   patient is on a cardiac monitor. Right hemodialysis catheter in tracheostomy in   expected positions. There is no pneumothorax and the lungs are clear apart from   linear atelectasis of left lower lung. Median sternotomy wires are noted. The   cardiac and mediastinal contours and pulmonary vascularity are normal.  The   bones and soft tissues are grossly within normal limits. Assessment and Plan:   Sepsis: Still source is not clear, being treated now with cefepime for UTI, pending culture. Also suspicion for C. difficile and stool sample been sent. Rapid Covid testing was negative. Chronic kidney disease/end-stage renal disease: Patient has dialysis catheter, no enough history to indicate if he is on regular dialysis or not.   Nephrology consulted and I appreciate their input  History of A. fib: Patient on amiodarone and Eliquis, resume amiodarone and will resume Eliquis when clarifying if he is on regular dialysis or not at this may affect the dose. Ventilator management, minimal setting, mainly for hypercapnia, will repeat ABG  Encephalopathy: This was multifactorial, seems to be back to baseline. Sacral pressure ulcer: Present on admission, wound care    DISPOSITION  Stay in ICU    CRITICAL CARE CONSULTANT NOTE  I had a face to face encounter with the patient, reviewed and interpreted patient data including clinical events, labs, images, vital signs, I/O's, and examined patient. I have discussed the case and the plan and management of the patient's care with the consulting services, the bedside nurses and the respiratory therapist.      NOTE OF PERSONAL INVOLVEMENT IN CARE   This patient has a high probability of imminent, clinically significant deterioration, which requires the highest level of preparedness to intervene urgently. I participated in the decision-making and personally managed or directed the management of the following life and organ supporting interventions that required my frequent assessment to treat or prevent imminent deterioration. I personally spent 40 minutes of critical care time. This is time spent at this critically ill patient's bedside actively involved in patient care as well as the coordination of care and discussions with the patient's family. This does not include any procedural time which has been billed separately. Verenice Simeon M.D.   Staff Intensivist/Pulmonologist  [facility]  1/11/2021

## 2021-01-11 NOTE — PROGRESS NOTES
0645: TRANSFER - IN REPORT:  Verbal report received from [address] (name) on Robb Barraza  being received from ED (unit) for routine progression of care    Report consisted of patients Situation, Background, Assessment and   Recommendations(SBAR). Information from the following report(s) SBAR, Kardex, Intake/Output, MAR and Recent Results was reviewed with the receiving nurse. Opportunity for questions and clarification was provided. Assessment completed upon patients arrival to unit and care assumed. 0700: patient arrived to unit - chg bath given - Primary Nurse Melina Hastings RN and Jesse North RN performed a dual skin assessment on this patient Impairment noted- see wound doc flow sheet  Current Bed: [facility]. Deo score is 11      0730: Bedside and Verbal shift change report given to Alyssa BARNETT  (oncoming nurse) by [address]  (offgoing nurse). Report included the following information SBAR, Kardex, Intake/Output, MAR and Recent Results.

## 2021-01-11 NOTE — PROGRESS NOTES
0730 Received verbal bedside repot from Susan iGordano, [address] [place] [address]. Assumed care of the pt.    0800 COVID PCR test ordered. Pt placed on dropet plus isolation. 0830 Wound care nurse at the bedside. Sacral wound dressed. Infection control on the phone. States pt is negative for C diff.    1602 TRANSFER - OUT REPORT:    Verbal report given to Lanie Lanza RN (name) on [facility]  being transferred to ICU (unit) for routine progression of care       Report consisted of patients Situation, Background, Assessment and   Recommendations(SBAR). Information from the following report(s) SBAR, Kardex, Procedure Summary, Intake/Output, MAR, Recent Results and Cardiac Rhythm ST was reviewed with the receiving nurse. Lines:   Peripheral IV 01/10/21 Left;Upper Arm (Active)   Site Assessment Clean, dry, & intact 01/11/21 0800   Phlebitis Assessment 0 01/11/21 0800   Infiltration Assessment 0 01/11/21 0800   Dressing Status Clean, dry, & intact 01/11/21 0800   Dressing Type Transparent;4 X 4 01/11/21 0800   Hub Color/Line Status Pink; Infusing 01/11/21 0800   Action Taken Open ports on tubing capped 01/11/21 0800   Alcohol Cap Used Yes 01/11/21 0800        Opportunity for questions and clarification was provided.       Patient transported with:   Monitor  Registered Nurse  Tech   Ventilator

## 2021-01-11 NOTE — PROGRESS NOTES
1700: TRANSFER - IN REPORT:    Verbal report received from ERICKA Shah (name) on Mayte Caller  being received from CCU (unit) for routine progression of care      Report consisted of patients Situation, Background, Assessment and   Recommendations(SBAR). Information from the following report(s) SBAR, Kardex, Intake/Output, MAR, Recent Results, Med Rec Status, Cardiac Rhythm NSR and Alarm Parameters  was reviewed with the receiving nurse. Opportunity for questions and clarification was provided. Assessment completed upon patients arrival to unit and care assumed. Primary Nurse Roberto Arriola and Jesus Hu RN performed a dual skin assessment on this patient Impairment noted- see wound doc flow sheet. Deo score is 13    1930: Bedside and Verbal shift change report given to Anai Baker RN (oncoming nurse) by Mary Spencer RN (offgoing nurse). Report included the following information SBAR, Kardex, Intake/Output, MAR, Recent Results, Med Rec Status, Cardiac Rhythm ST and Alarm Parameters .

## 2021-01-11 NOTE — PROGRESS NOTES
[facility] - [facility]OSY [facility] pt being followed by [facility]  Will place consult to their group

## 2021-01-11 NOTE — INTERDISCIPLINARY ROUNDS
Multidisciplinary rounds were held 1/11/21.   Today's plan/goal includes (but not limited to): PCR COVID test, transfer to ICU, nephrology consult, start TF.

## 2021-01-11 NOTE — CONSULTS
[address]    Name:  Jj Delgado  MR#:  [id number]  :  1955  ACCOUNT #:  [de-identified]  DATE OF SERVICE:  2021    IN-HOSPITAL NEPHROLOGY CONSULTATION    REFERRING PHYSICIAN:  Dr. Tia Hook. REASON FOR CONSULTATION:  Provision of renal replacement therapy during this hospitalization. HISTORY OF PRESENT ILLNESS:  The patient is well known to me from previous admission at [facility].  The patient was admitted initially there for attempt to be weaned off respiratory dependency and acute rehabilitation. The patient has a history of chronic kidney disease stage IV and acute kidney injury. He was requiring dialysis. His dialysis was every Monday, Wednesday and Friday, and it was pretty much uneventful. The patient was acutely sent out from [facility] yesterday because of altered mental status, lesser degree of responsiveness and febrile illness. Currently, he cannot provide any meaningful review of systems or history. The patient was able to communicate, and his mental status was quite clear up to the end of last week. Currently, he is being ruled out for COVID with PCR. The rapid COVID test was negative. The patient did have COVID pneumonia in 10/2020. PAST MEDICAL HISTORY:  1. Acute respiratory failure. 2.  COVID pneumonia. 3.  Chronic kidney disease. 4.  Altered mental status. 5.  Anoxic encephalopathy. PAST SURGICAL HISTORY:  Tracheostomy placement. ALLERGIES:  ALLERGIC TO CODEINE AND PENCICLOVIR. CURRENT MEDICATIONS:  List includes  1. MiraLax. 2.  Phenergan. 3.  Zofran. 4.  Maxipime. 5.  Collagenase. SOCIAL HISTORY:  Unobtainable currently. FAMILY HISTORY:  Unobtainable currently. REVIEW OF SYSTEMS:  Unobtainable. PHYSICAL EXAMINATION:  GENERAL:  Middle-aged white man who is not in acute distress. He is lying supine, on vent. He is obese. VITAL SIGNS:  Blood pressure is 107/57, heart rate is 92, respirations 18.   HEENT: Head is atraumatic. Eyes are closed. NECK:  With tracheostomy. LUNGS:  Decreased breath sounds with no rales. HEART:  With S1 and S2, tachycardic rate and regular rhythm. ABDOMEN:  Soft, nontender, nondistended. EXTREMITIES:  With no edema. SKIN:  Warm and dry. NEUROLOGIC:  The patient is unresponsive. LABORATORY DATA:  White blood count 6.1, hemoglobin 7.2. Sodium is 133, potassium is 5, CO2 is 22, BUN is 76, creatinine is 5.29. Urine output is 400 mL. He has drains with output of 350. IMPRESSION:  1. Acute kidney injury, now dialysis dependent. The patient is due for his dialysis today. He has mild pre-dialysis hyperkalemia. 2.  Respiratory failure, on ventilator. 3.  Altered mental status and febrile illness. The patient is being tested for COVID again. He had COVID back in 10/2020. RECOMMENDATIONS:  1. The patient will be dialyzed today. All orders are in chart. We will have minimal goal for UF due to his ongoing hypotension and fever and no evidence of pulmonary edema. 2.  Anemia management:  Assess iron profile. Start anemia management with Procrit. 3.  Risk for deterioration is quite high. Our service will follow up with you closely. Thank you very much for the opportunity to be part of this patient's care.       Toan Pierce MD      [id number]  D:  01/11/2021 12:36  T:  01/11/2021 17:40  JOB #:  [id number]  CC:  Omar Knig MD

## 2021-01-11 NOTE — PROGRESS NOTES
Comprehensive Nutrition Assessment    Type and Reason for Visit: Initial    Nutrition Recommendations/Plan:    1. Restart tube feeds via PEG:    - Nepro @ 25ml/hr advanced to 50ml/hr + 1 pkt prosource daily + 60ml flush q4hr    2. Add POC Glucose checks:    - Resume insulin as indicated - On lantus 30 units BID at [address] [place] [address]     3. Document all output from FMT   - add Shayy-q & imodium PRN pending C.diff results    Nutrition Assessment:    Pt admitted for AMS from [address] [place] [address]. PMHx: DM, ESRD on HD, afib, anoxic brain injury, trach (20). Per ED notes pt with anoxic brain injury after coding d/t COVID in 2020. On trach collar on admit but place back on vent via trach in ED. UTI with sepsis noted with abx rx; possibility of C.diff also being ruled out. Spoke with RD at [address] [place] [address]. Pt on Nepro @ 50ml/hr with 60ml flush q4hr. Provides: 1200ml, 2160kcal, 97g protein, 872ml free fluid + 360ml flush = 1232ml fluid. Will add Prosource daily to better meet protein needs = 2220kcal, 112g protein. Meets 100% energy and 98% protein needs. RD reports no issues with diarrhea when last assessed there but had been on vanco for bacteremia while at [address] [place] [address]. FMT placed yesterday. Consider Shayy-Q daily pending c.diff results. Malnutrition Assessment:  Malnutrition Status: At risk for malnutrition (specify)(critical illness)      Nutritionally Significant Medications: cefepime, pepcid, LR @ 75ml/hr; PRN: miralax, phenegran/zofran    Estimated Daily Nutrient Needs:  Energy (kcal): 2200([facility] b); Weight Used for Energy Requirements: Current(87.7kg)  Protein (g): 114-132g (1.3-1.5g/kg);  Weight Used for Protein Requirements: Current(87.7kg)  Fluid (ml/day): 1500 - or per renal; Method Used for Fluid Requirements: Standard renal     8.27 l/min    Temp (24hrs), Av.3 °F (38.5 °C), Min:99.9 °F (37.7 °C), Max:102.7 °F (39.3 °C)    Nutrition Related Findings:       BM:  - FMT placed 1/10  Edema: none  Wounds:  Deep tissue injury(sacral POA - see WOCN notes)       Current Nutrition Therapies:   Diet: NPO  Tube feeds: none    Anthropometric Measures:  · Height:  5' 10\" (177.8 cm)  · Current Body Wt:  87.7 kg (193 lb 5.5 oz)   · Admission Body Wt:  192 lb 10.9 oz    · Usual Body Wt:      87.4kg (at [facility])  · Ideal Body Wt:   :  116.5 %  Wt Readings from Last 10 Encounters:   01/11/21 87.7 kg (193 lb 5.5 oz)     Nutrition Diagnosis:   · Increased nutrient needs related to catabolic illness(wound healing) as evidenced by wounds(?COVID)    · Altered GI function related to (?abx, tube feeds, infection) as evidenced by diarrhea    · Inadequate oral intake related to swallowing difficulty, impaired respiratory function as evidenced by NPO or clear liquid status due to medical condition, nutrition support-enteral nutrition    Nutrition Interventions:   Food and/or Nutrient Delivery: Start tube feeding  Nutrition Education and Counseling: No recommendations at this time  Coordination of Nutrition Care: Continue to monitor while inpatient    Goals:  EN meeting at least 90% needs in 2-3 days       Nutrition Monitoring and Evaluation:   Behavioral-Environmental Outcomes: None identified  Food/Nutrient Intake Outcomes: Enteral nutrition intake/tolerance  Physical Signs/Symptoms Outcomes: Biochemical data, Weight, Diarrhea, Skin    Discharge Planning:    Enteral nutrition     Karel Dolan, RD  [address] , Pager #[phone] or via [email or web address]

## 2021-01-11 NOTE — CONSULTS
Patient is known to me from [facility].  On HD- MWF schedule  Transferred acutely for ? AMS and fever  HD later today  Diallo Reyes was notified.

## 2021-01-11 NOTE — WOUND CARE
Wound Consult:  New Patient Visit. Chart reviewed. Consulted for sacral injury POA. Spoke with patients nurse and we were in together to assess and provide care. Patient is resting on a Rm In Touch bed. Trach on vent for support; eyes spontaneous; follows simple commands; PEG tube. Assessment: 
Sacral / buttocks - 12 x 10 x 0.1 cm, non-blanching purple intact and non-intact skin; chad red edges, pink blanching further out of edge; DTI POA; small amount serosanguinous drainage. Perineal/scrotal area - IAD, red, irritated intact skin with a few areas of scattered denuded skin on posterior scrotum/perineum. Left 2nd toe tip - 1 x 1 cm area of dry, stable appearing black eschar. Most likely ischemic injury; history of code blue in past/COVID. Left great toe - small dry eschar ~ 0.3 x 0.3 cm, also most likely ischemic. Right lateral ankle - 1 x 1 cm area of light brown eschar; no surrounding redness, Unstageable pressure injury POA. Right heel - 0.9 x 0.7 cm area of light brown eschar; no surrounding redness, Unstageable pressure injury POA. Right lateral lower leg - scabbed abrasion; 3 x 0.7 cm, no surrounding redness, POA. Dry skin on feet and lower legs. Treatment: 
Opticell Ag with tegaderm to secure to sacral/buttocks injury. Z guard ointment to perineum/posterior scrotal area. Off loading heels with pillows. Wound Recommendations: 
Cleanse sacral / buttocks injury with saline, apply thick layer of Santyl, cover with sheet of Opticell Ag, secure with Tegaderm daily. Skin Care Recommendations: 1. Minimize friction/shear: minimize layers of linen/pads under patient. 2. Off load pressure/reposition: continue to turn and reposition approximately every 2 hours; float heels or use off loading heel boots. 3. Manage Moisture - keep skin folds dry; incontinence skin care as needed; dale in use to help contain urine; FMS in use to help contain stool - place in ED. 4. Continue to monitor nutrition, pain, and skin risk scale, and skin assessment. Plan: 
Spoke with ICU MD regarding findings and proposed orders for treatment. We will continue to reassess routinely and as needed. Quiana Cruz RN,[facility]ound [facility]aling Office [phone] Pager [phone]

## 2021-01-12 LAB
ANION GAP SERPL CALC-SCNC: 9 MMOL/L (ref 5–15)
BACTERIA SPEC CULT: ABNORMAL
BASOPHILS # BLD: 0 K/UL (ref 0–0.1)
BASOPHILS # BLD: 0 K/UL (ref 0–0.1)
BASOPHILS NFR BLD: 0 % (ref 0–1)
BASOPHILS NFR BLD: 1 % (ref 0–1)
BUN SERPL-MCNC: 88 MG/DL (ref 6–20)
BUN/CREAT SERPL: 15 (ref 12–20)
CALCIUM SERPL-MCNC: 9.2 MG/DL (ref 8.5–10.1)
CC UR VC: ABNORMAL
CHLORIDE SERPL-SCNC: 104 MMOL/L (ref 97–108)
CO2 SERPL-SCNC: 21 MMOL/L (ref 21–32)
CREAT SERPL-MCNC: 5.85 MG/DL (ref 0.7–1.3)
DIFFERENTIAL METHOD BLD: ABNORMAL
DIFFERENTIAL METHOD BLD: ABNORMAL
EOSINOPHIL # BLD: 0 K/UL (ref 0–0.4)
EOSINOPHIL # BLD: 0.1 K/UL (ref 0–0.4)
EOSINOPHIL NFR BLD: 1 % (ref 0–7)
EOSINOPHIL NFR BLD: 2 % (ref 0–7)
ERYTHROCYTE [DISTWIDTH] IN BLOOD BY AUTOMATED COUNT: 17.2 % (ref 11.5–14.5)
ERYTHROCYTE [DISTWIDTH] IN BLOOD BY AUTOMATED COUNT: 18.6 % (ref 11.5–14.5)
GLUCOSE SERPL-MCNC: 115 MG/DL (ref 65–100)
HBV SURFACE AG SER QL: <0.1 INDEX
HBV SURFACE AG SER QL: NEGATIVE
HCT VFR BLD AUTO: 22.9 % (ref 36.6–50.3)
HCT VFR BLD AUTO: 24.7 % (ref 36.6–50.3)
HEALTH STATUS, XMCV2T: ABNORMAL
HGB BLD-MCNC: 6.8 G/DL (ref 12.1–17)
HGB BLD-MCNC: 7.7 G/DL (ref 12.1–17)
HISTORY CHECKED?,CKHIST: NORMAL
IMM GRANULOCYTES # BLD AUTO: 0 K/UL (ref 0–0.04)
IMM GRANULOCYTES # BLD AUTO: 0.1 K/UL (ref 0–0.04)
IMM GRANULOCYTES NFR BLD AUTO: 0 % (ref 0–0.5)
IMM GRANULOCYTES NFR BLD AUTO: 1 % (ref 0–0.5)
IRON SATN MFR SERPL: 6 % (ref 20–50)
IRON SERPL-MCNC: 13 UG/DL (ref 35–150)
LYMPHOCYTES # BLD: 0.5 K/UL (ref 0.8–3.5)
LYMPHOCYTES # BLD: 0.6 K/UL (ref 0.8–3.5)
LYMPHOCYTES NFR BLD: 11 % (ref 12–49)
LYMPHOCYTES NFR BLD: 11 % (ref 12–49)
MAGNESIUM SERPL-MCNC: 2.5 MG/DL (ref 1.6–2.4)
MCH RBC QN AUTO: 26.2 PG (ref 26–34)
MCH RBC QN AUTO: 27 PG (ref 26–34)
MCHC RBC AUTO-ENTMCNC: 29.7 G/DL (ref 30–36.5)
MCHC RBC AUTO-ENTMCNC: 31.2 G/DL (ref 30–36.5)
MCV RBC AUTO: 86.7 FL (ref 80–99)
MCV RBC AUTO: 88.1 FL (ref 80–99)
MONOCYTES # BLD: 0.5 K/UL (ref 0–1)
MONOCYTES # BLD: 0.5 K/UL (ref 0–1)
MONOCYTES NFR BLD: 11 % (ref 5–13)
MONOCYTES NFR BLD: 9 % (ref 5–13)
NEUTS SEG # BLD: 3.2 K/UL (ref 1.8–8)
NEUTS SEG # BLD: 4.4 K/UL (ref 1.8–8)
NEUTS SEG NFR BLD: 76 % (ref 32–75)
NEUTS SEG NFR BLD: 77 % (ref 32–75)
NRBC # BLD: 0 K/UL (ref 0–0.01)
NRBC # BLD: 0 K/UL (ref 0–0.01)
NRBC BLD-RTO: 0 PER 100 WBC
NRBC BLD-RTO: 0 PER 100 WBC
PHOSPHATE SERPL-MCNC: 3.6 MG/DL (ref 2.6–4.7)
PLATELET # BLD AUTO: 100 K/UL (ref 150–400)
PLATELET # BLD AUTO: 120 K/UL (ref 150–400)
PMV BLD AUTO: 11 FL (ref 8.9–12.9)
PMV BLD AUTO: 11.1 FL (ref 8.9–12.9)
POTASSIUM SERPL-SCNC: 4.7 MMOL/L (ref 3.5–5.1)
RBC # BLD AUTO: 2.6 M/UL (ref 4.1–5.7)
RBC # BLD AUTO: 2.85 M/UL (ref 4.1–5.7)
RBC MORPH BLD: ABNORMAL
SARS-COV-2, COV2: DETECTED
SERVICE CMNT-IMP: ABNORMAL
SODIUM SERPL-SCNC: 134 MMOL/L (ref 136–145)
SOURCE, COVRS: ABNORMAL
SPECIMEN SOURCE, FCOV2M: ABNORMAL
SPECIMEN TYPE, XMCV1T: ABNORMAL
TIBC SERPL-MCNC: 204 UG/DL (ref 250–450)
WBC # BLD AUTO: 4.2 K/UL (ref 4.1–11.1)
WBC # BLD AUTO: 5.7 K/UL (ref 4.1–11.1)

## 2021-01-12 PROCEDURE — 85025 COMPLETE CBC W/AUTO DIFF WBC: CPT

## 2021-01-12 PROCEDURE — 5A1D70Z PERFORMANCE OF URINARY FILTRATION, INTERMITTENT, LESS THAN 6 HOURS PER DAY: ICD-10-PCS | Performed by: INTERNAL MEDICINE

## 2021-01-12 PROCEDURE — 87340 HEPATITIS B SURFACE AG IA: CPT

## 2021-01-12 PROCEDURE — 74011250636 HC RX REV CODE- 250/636: Performed by: INTERNAL MEDICINE

## 2021-01-12 PROCEDURE — 74011250637 HC RX REV CODE- 250/637: Performed by: INTERNAL MEDICINE

## 2021-01-12 PROCEDURE — 74011000258 HC RX REV CODE- 258: Performed by: INTERNAL MEDICINE

## 2021-01-12 PROCEDURE — 80048 BASIC METABOLIC PNL TOTAL CA: CPT

## 2021-01-12 PROCEDURE — 86850 RBC ANTIBODY SCREEN: CPT

## 2021-01-12 PROCEDURE — 86923 COMPATIBILITY TEST ELECTRIC: CPT

## 2021-01-12 PROCEDURE — 74011000250 HC RX REV CODE- 250: Performed by: INTERNAL MEDICINE

## 2021-01-12 PROCEDURE — 36430 TRANSFUSION BLD/BLD COMPNT: CPT

## 2021-01-12 PROCEDURE — 84100 ASSAY OF PHOSPHORUS: CPT

## 2021-01-12 PROCEDURE — 83735 ASSAY OF MAGNESIUM: CPT

## 2021-01-12 PROCEDURE — 90935 HEMODIALYSIS ONE EVALUATION: CPT

## 2021-01-12 PROCEDURE — 83540 ASSAY OF IRON: CPT

## 2021-01-12 PROCEDURE — 94760 N-INVAS EAR/PLS OXIMETRY 1: CPT

## 2021-01-12 PROCEDURE — 94003 VENT MGMT INPAT SUBQ DAY: CPT

## 2021-01-12 PROCEDURE — 30233N1 TRANSFUSION OF NONAUTOLOGOUS RED BLOOD CELLS INTO PERIPHERAL VEIN, PERCUTANEOUS APPROACH: ICD-10-PCS | Performed by: INTERNAL MEDICINE

## 2021-01-12 PROCEDURE — P9016 RBC LEUKOCYTES REDUCED: HCPCS

## 2021-01-12 PROCEDURE — 65610000006 HC RM INTENSIVE CARE

## 2021-01-12 PROCEDURE — 36415 COLL VENOUS BLD VENIPUNCTURE: CPT

## 2021-01-12 RX ORDER — HEPARIN SODIUM 5000 [USP'U]/ML
5000 INJECTION, SOLUTION INTRAVENOUS; SUBCUTANEOUS EVERY 8 HOURS
Status: DISCONTINUED | OUTPATIENT
Start: 2021-01-13 | End: 2021-01-12

## 2021-01-12 RX ORDER — ASCORBIC ACID 500 MG
1000 TABLET ORAL DAILY
Status: DISCONTINUED | OUTPATIENT
Start: 2021-01-12 | End: 2021-01-13 | Stop reason: HOSPADM

## 2021-01-12 RX ORDER — MELATONIN
2000 DAILY
Status: DISCONTINUED | OUTPATIENT
Start: 2021-01-12 | End: 2021-01-13 | Stop reason: HOSPADM

## 2021-01-12 RX ORDER — ZINC SULFATE 50(220)MG
1 CAPSULE ORAL DAILY
Status: DISCONTINUED | OUTPATIENT
Start: 2021-01-12 | End: 2021-01-12

## 2021-01-12 RX ORDER — SODIUM CHLORIDE 9 MG/ML
250 INJECTION, SOLUTION INTRAVENOUS AS NEEDED
Status: DISCONTINUED | OUTPATIENT
Start: 2021-01-12 | End: 2021-01-13 | Stop reason: HOSPADM

## 2021-01-12 RX ORDER — HEPARIN SODIUM 1000 [USP'U]/ML
3600 INJECTION, SOLUTION INTRAVENOUS; SUBCUTANEOUS
Status: DISCONTINUED | OUTPATIENT
Start: 2021-01-12 | End: 2021-01-13 | Stop reason: HOSPADM

## 2021-01-12 RX ORDER — ZINC SULFATE 50(220)MG
1 CAPSULE ORAL DAILY
Status: DISCONTINUED | OUTPATIENT
Start: 2021-01-13 | End: 2021-01-13 | Stop reason: HOSPADM

## 2021-01-12 RX ORDER — CHLORHEXIDINE GLUCONATE 0.12 MG/ML
15 RINSE ORAL EVERY 12 HOURS
Status: DISCONTINUED | OUTPATIENT
Start: 2021-01-12 | End: 2021-01-13 | Stop reason: HOSPADM

## 2021-01-12 RX ORDER — HEPARIN SODIUM 5000 [USP'U]/ML
5000 INJECTION, SOLUTION INTRAVENOUS; SUBCUTANEOUS EVERY 8 HOURS
Status: DISCONTINUED | OUTPATIENT
Start: 2021-01-12 | End: 2021-01-13

## 2021-01-12 RX ADMIN — FAMOTIDINE 20 MG: 10 INJECTION, SOLUTION INTRAVENOUS at 10:06

## 2021-01-12 RX ADMIN — Medication 2 TABLET: at 12:25

## 2021-01-12 RX ADMIN — HEPARIN SODIUM 3600 UNITS: 1000 INJECTION INTRAVENOUS; SUBCUTANEOUS at 06:29

## 2021-01-12 RX ADMIN — Medication 10 ML: at 21:44

## 2021-01-12 RX ADMIN — Medication 1 CAPSULE: at 12:25

## 2021-01-12 RX ADMIN — CHLORHEXIDINE GLUCONATE 15 ML: 0.12 RINSE ORAL at 20:15

## 2021-01-12 RX ADMIN — OXYCODONE HYDROCHLORIDE AND ACETAMINOPHEN 1000 MG: 500 TABLET ORAL at 10:07

## 2021-01-12 RX ADMIN — CEFEPIME HYDROCHLORIDE 1 G: 1 INJECTION, POWDER, FOR SOLUTION INTRAMUSCULAR; INTRAVENOUS at 10:06

## 2021-01-12 RX ADMIN — HEPARIN SODIUM 5000 UNITS: 5000 INJECTION INTRAVENOUS; SUBCUTANEOUS at 12:26

## 2021-01-12 RX ADMIN — SODIUM CHLORIDE, POTASSIUM CHLORIDE, SODIUM LACTATE AND CALCIUM CHLORIDE 75 ML/HR: 600; 310; 30; 20 INJECTION, SOLUTION INTRAVENOUS at 06:00

## 2021-01-12 RX ADMIN — ACETAMINOPHEN 650 MG: 325 TABLET ORAL at 10:24

## 2021-01-12 RX ADMIN — SODIUM CHLORIDE, POTASSIUM CHLORIDE, SODIUM LACTATE AND CALCIUM CHLORIDE 75 ML/HR: 600; 310; 30; 20 INJECTION, SOLUTION INTRAVENOUS at 21:44

## 2021-01-12 RX ADMIN — CHLORHEXIDINE GLUCONATE 15 ML: 0.12 RINSE ORAL at 12:27

## 2021-01-12 RX ADMIN — HEPARIN SODIUM 5000 UNITS: 5000 INJECTION INTRAVENOUS; SUBCUTANEOUS at 20:15

## 2021-01-12 RX ADMIN — Medication 1 CAPSULE: at 10:07

## 2021-01-12 RX ADMIN — Medication 10 ML: at 15:23

## 2021-01-12 RX ADMIN — COLLAGENASE SANTYL: 250 OINTMENT TOPICAL at 10:07

## 2021-01-12 NOTE — PROGRESS NOTES
1930: Bedside and Verbal shift change report given to MIKE Orellana RN (oncoming nurse) by [employer]. Mendel Bridgeman, ERICKA (offgoing nurse). Report included the following information SBAR, Kardex, Intake/Output, MAR, Accordion, Recent Results, Cardiac Rhythm NSR, Alarm Parameters  and Dual Neuro Assessment. 0250Archie Collet RN at bedside for hemodialysis. Consent obtained from Cole Negron, patient's wife for HD.      3507:  HD completed, 1.5L removed. 0730: Bedside and Verbal shift change report given to BYRON Sommer RN (oncoming nurse) by Babita Orellana RN (offgoing nurse). Report included the following information SBAR, Kardex, Intake/Output, MAR, Accordion, Recent Results, Cardiac Rhythm sinus tachycardia, Alarm Parameters  and Dual Neuro Assessment.

## 2021-01-12 NOTE — PROGRESS NOTES
0730: Bedside and Verbal shift change report given to ERICKA Chao (oncoming nurse) by Joslyn Long RN (offgoing nurse). Report included the following information SBAR, Kardex, Intake/Output, MAR, Recent Results, Cardiac Rhythm ST and Alarm Parameters . Dr. Doris Cooper notified of patient's fever. No orders received. 1628: 1 unit PRBC started per order. 1800: Family(Wife and Daughter in law) updated on patient's condition, answered questions about planned [place], made aware of canceled  transfer per Dr. Boss(treating intensivist). [facility] notified of the transfer cancellation.

## 2021-01-12 NOTE — PROGRESS NOTES
[facility]    ICU Team H&P     Name: Jesus Paige   : 1955   MRN: [id number]   Date: 2021      A/P   1. ?UTI      2. DMII - SSI     3. CKI IV with LYDIA    A. Trend CR     4. Chronic resp failure - now baseline     5. Anoxic brain injury - baseline     5. [facility]ib - [facility] [address] [facility] and restart meds as needed     6. SARS + - asymptomatic    A. VIT C    B. Zinc      POD:* No surgery found *      Problem List  Never Reviewed          Codes Class    Sepsis ([facility] [address] ICD-10-CM: A41.9  ICD-9-CM: 038.9, 995.91               Past Medical History:   Covid PNA with trach at [facility]   CKD IV   DMII  Afib  Debilitatin   Anoxic brain injury - chronic - able to communicate needs     Past Surgical History:      has no past surgical history on file. Home Medications:     Prior to Admission medications    Medication Sig Start Date End Date Taking? Authorizing Provider   polyvinyl alcohol-KITTY fitzgerald, (REFRESH CLASSIC) 1.4-0.6 % ophthalmic solution Administer 1 Drop to both eyes every six (6) hours as needed. Yes Provider, Historical   atorvastatin (LIPITOR) 40 mg tablet Take 40 mg by mouth nightly. Yes Provider, Historical   cholecalciferol (Vitamin D3) (1000 Units /25 mcg) tablet Take 2,000 Units by mouth daily. Yes Provider, Historical   gabapentin (NEURONTIN) 100 mg capsule Take 100 mg by mouth three (3) times daily. Yes Provider, Historical   b complex-vitamin c-folic acid (Nephronex) 900 mcg/5 mL liqd Take 5 mL by mouth nightly. Yes Provider, Historical   chlorhexidine (Paroex Oral Rinse) 0.12 % solution 15 mL by Swish and Spit route two (2) times daily as needed. Yes Provider, Historical   fluconazole (Diflucan) 100 mg tablet Take 100 mg by mouth daily. FDA advises cautious prescribing of oral fluconazole in pregnancy. Yes Provider, Historical   acetaminophen (TYLENOL) 325 mg tablet Take 650 mg by mouth every six (6) hours as needed for Pain.    Yes Provider, Historical   amiodarone (CORDARONE) 200 mg tablet Take 200 mg by mouth two (2) times a day. Yes Provider, Historical   apixaban (ELIQUIS) 5 mg tablet Take 5 mg by mouth two (2) times a day. Yes Provider, Historical   clonazePAM (KlonoPIN) 0.5 mg tablet Take 0.5 mg by mouth every eight (8) hours. Yes Provider, Historical   albuterol-ipratropium (DUO-NEB) 2.5 mg-0.5 mg/3 ml nebu 3 mL by Nebulization route two (2) times a day. Yes Provider, Historical   midodrine (PROAMATINE) 10 mg tablet Take  by mouth Every Tuesday, Thursday and Saturday. Yes Provider, Historical   insulin lispro (HumaLOG U-100 Insulin) 100 unit/mL injection 1 Units by SubCUTAneous route three (3) times daily as needed (meals). Yes Provider, Historical   pantoprazole (Protonix) 40 mg tablet Take 40 mg by mouth daily. Yes Provider, Historical   albuterol (PROVENTIL VENTOLIN) 2.5 mg /3 mL (0.083 %) nebu 2.5 mg by Nebulization route every six (6) hours as needed for Wheezing. Yes Provider, Historical   metoprolol tartrate (Lopressor) 100 mg IR tablet Take 100 mg by mouth two (2) times a day. Yes Provider, Historical   escitalopram oxalate (Lexapro) 10 mg tablet Take 10 mg by mouth daily. Yes Provider, Historical   darbepoetin chucho (Aranesp, Polysorbate,) 60 mcg/0.3 mL injection 60 mcg by SubCUTAneous route Every Thursday. Yes Provider, Historical   insulin glargine (Lantus U-100 Insulin) 100 unit/mL injection 30 Units by SubCUTAneous route every twelve (12) hours. Yes Provider, Historical       Allergies/Social/Family History: Allergies   Allergen Reactions    Codeine Nausea Only    Penciclovir Unknown (comments)      Social History     Tobacco Use    Smoking status: Not on file   Substance Use Topics    Alcohol use: Not on file      History reviewed. No pertinent family history.     Review of Systems:     Unable     Objective:   Vital Signs:  Visit Vitals  BP (!) 129/58   Pulse (!) 128   Temp 99.7 °F (37.6 °C)   Resp 25   Ht 5' 10\" (1.778 m)   Wt 87.7 kg (193 lb 5.5 oz)   SpO2 99%   BMI 27.74 kg/m²      O2 Device: Tracheostomy, Ventilator Temp (24hrs), Av.9 °F (37.7 °C), Min:99.1 °F (37.3 °C), Max:101.1 °F (38.4 °C)           Intake/Output:     Intake/Output Summary (Last 24 hours) at 2021 0711  Last data filed at 2021 0630  Gross per 24 hour   Intake 2330 ml   Output 2150 ml   Net 180 ml       Physical Exam:    General:  Baseline    Eyes:  Sclera anicteric. Pupils equal, round, reactive to light.   Mouth/Throat: Orotracheal tube in place.   Neck: Supple.   Lungs:   Clear to auscultation bilaterally, good effort.   Cardiovascular:  Regular rate and rhythm, no murmur, click, rub, or gallop.   Abdomen:   Soft, non-tender, bowel sounds normal, non-distended.   Extremities: No cyanosis or edema.   Skin: No acute rash or lesions.   Lymph Nodes: Cervical and supraclavicular normal.   Musculoskeletal:  No swelling or deformity.   Lines/Devices:  Intact, no erythema, drainage, or tenderness.         LABS AND  DATA: Personally reviewed  Recent Labs     21  0236 21  0652   WBC 5.7 6.1   HGB 6.8* 7.2*   HCT 22.9* 24.5*   * 130*     Recent Labs     21  0236 21  0652   * 133*   K 4.7 5.0    103   CO2 21 22   BUN 88* 76*   CREA 5.85* 5.29*   * 122*   CA 9.2 9.3   MG 2.5*  --    PHOS 3.6  --      Recent Labs     21  0652 01/10/21  0717   AP 87 110   TP 6.2* 7.8   ALB 2.6* 3.2*   GLOB 3.6 4.6*     No results for input(s): INR, PTP, APTT, INREXT, INREXT in the last 72 hours.   Recent Labs     21  1004 01/10/21  0726   PHI 7.26* 7.23*   PCO2I 46.8* 55.8*   PO2I 45* 110*   FIO2I 0.35 40     Recent Labs     01/10/21  0717   TROIQ <0.05       Hemodynamics:   PAP:   CO:     Wedge:   CI:     CVP:    SVR:       PVR:       Ventilator Settings:  Mode Rate Tidal Volume Pressure FiO2 PEEP   Volume control, Assist control   500 ml    35 % 8 cm H20     Peak airway pressure: 39 cm H2O    Minute ventilation:  9.9 l/min        MEDS: Reviewed    Chest X-Ray: personally reviewed and report checked      DISPOSITION  Stay in ICU    CRITICAL CARE     I personally spent 35 minutes of critical care time. This is time spent at this critically ill patient's bedside actively involved in patient care as well as the coordination of care and discussions with the patient's family. This does not include any procedural time which has been billed separately.     [address] [place] [address]  1/12/2021

## 2021-01-12 NOTE — PROGRESS NOTES
BRUCE  Patient admitted from [facility] with low O2 sats. RUR 16%  COVID positive  Disposition: Return to [facility] when medically stable    Plan for utilizing home health:    NA      PCP: First and Last name:  Dr Dolly Quinteros    Name of Practice: [facility] physician   Are you a current patient: Yes/No:    Approximate date of last visit:    Can you participate in a virtual visit with your PCP:                     Current Advanced Directive/Advance Care Plan: Not on file                         Attended  IDR  Rounds, per MD patient is stable to be discharged back to [facility]. after transfusion today. JAIR set up transport with [facility] [phone] for 7:00 pm tonight. Nursing report to be called to [phone] or to Nursing Supervisor [phone]. Dr Carol Siegel will be the accepting physician and report can be called to [phone]. JAIR spoke with patient's wife Lena Trammell and she is in agreement for patient to be discharged back to [facility].    Meliza Esteban RN,Care Management

## 2021-01-12 NOTE — DIALYSIS
[facility] Team [facility]  [phone]    Vitals   Pre   Post   Assessment   Pre   Post     Temp  Temp: 99.7 °F (37.6 °C) (01/12/21 0245)  99.7 LOC  Oriented to self Oriented to self and follows commands   HR   Pulse (Heart Rate): (!) 117 (01/12/21 0245) 128 Lungs   Diminished with trach collar on ventilator with 35% o2  diminished with trach collar on ventilator with 35%02   B/P   BP: (!) 119/58 (01/12/21 0245) 129/58 Cardiac   B/p wnl, ST  b/p wnl , ST   Resp   Resp Rate: 20 (01/12/21 0245) 22 Skin   intact  intact   Pain level  0 0 Edema  +1 upper ext     +1 upper ext's   Orders:    Duration:   Start:   0255 End:    8883 Total:   3.5hrs   Dialyzer:   Dialyzer/Set Up Inspection: Homero Azul (01/12/21 0245)   Geri Dean Bath:   Dialysate K (mEq/L): 2 (01/12/21 0245)   Ca Bath:   Dialysate CA (mEq/L): 2.5 (01/12/21 0245)   Na/Bicarb:   Dialysate NA (mEq/L): 148 (01/12/21 0245)   Target Fluid Removal:   Goal/Amount of Fluid to Remove (mL): 1500 mL (01/12/21 0245)   Access     Type & Location:   Rt subclavina perma-cath. Dressing changed, no s/s of infection. Each catheter limb disinfected per p&p, caps removed, hubs disinfected per p&p. Blood aspirated and lines flushed without any issues.        Labs     Obtained/Reviewed   Critical Results Called   Date when labs were drawn-  Hgb-    HGB   Date Value Ref Range Status   01/11/2021 7.2 (L) 12.1 - 17.0 g/dL Final     K-    Potassium   Date Value Ref Range Status   01/11/2021 5.0 3.5 - 5.1 mmol/L Final     Ca-   Calcium   Date Value Ref Range Status   01/11/2021 9.3 8.5 - 10.1 MG/DL Final     Bun-   BUN   Date Value Ref Range Status   01/11/2021 76 (H) 6 - 20 MG/DL Final     Creat-   Creatinine   Date Value Ref Range Status   01/11/2021 5.29 (H) 0.70 - 1.30 MG/DL Final        Medications/ Blood Products Given     Name   Dose   Route and Time     heparin 3800 unitsq 1800 units in each port             Blood Volume Processed (BVP):    77.1 Net Fluid   Removed: 1.5kg   Comments   Time Out Done: 0215  Primary Nurse Rpt Pre:Irene Orellana  RN  Primary Nurse Rpt Post:Irene Orellana RN  Pt Education:ESRD  Care Plan:ESRD  Tx Summary:  0230 Dialysis consent obtained by primary nurse via pt's wife  [phone] Dialysis started after labs drawn  [phone] Dialysis completed. Each dialysis catheter limb disinfected per p&p, blood returned per p&p, each dialysis hub disinfected per p&p, post dialysis catheter dwell instilled with heparin per order, and caps applied. Admiting Diagnosis:Sepsis  Pt's previous clinic-[facility]  Consent signed - Informed Consent Verified: Yes (01/12/21 0245)  Hepatitis Status- 1/12/21 pending, will bleach machine afterwards  Machine #- Machine Number: [id number] (01/12/21 0245)  Telemetry status-ST  Pre-dialysis wt. -

## 2021-01-13 VITALS
HEART RATE: 113 BPM | DIASTOLIC BLOOD PRESSURE: 72 MMHG | OXYGEN SATURATION: 100 % | BODY MASS INDEX: 28.44 KG/M2 | SYSTOLIC BLOOD PRESSURE: 131 MMHG | HEIGHT: 70 IN | RESPIRATION RATE: 18 BRPM | WEIGHT: 198.63 LBS | TEMPERATURE: 98.6 F

## 2021-01-13 LAB
ABO + RH BLD: NORMAL
ANION GAP SERPL CALC-SCNC: 10 MMOL/L (ref 5–15)
BACTERIA SPEC CULT: NORMAL
BASOPHILS # BLD: 0 K/UL (ref 0–0.1)
BASOPHILS NFR BLD: 1 % (ref 0–1)
BLD PROD TYP BPU: NORMAL
BLOOD GROUP ANTIBODIES SERPL: NORMAL
BPU ID: NORMAL
BUN SERPL-MCNC: 53 MG/DL (ref 6–20)
BUN/CREAT SERPL: 14 (ref 12–20)
CALCIUM SERPL-MCNC: 8.9 MG/DL (ref 8.5–10.1)
CHLORIDE SERPL-SCNC: 104 MMOL/L (ref 97–108)
CO2 SERPL-SCNC: 23 MMOL/L (ref 21–32)
CREAT SERPL-MCNC: 3.86 MG/DL (ref 0.7–1.3)
CROSSMATCH RESULT,%XM: NORMAL
DIFFERENTIAL METHOD BLD: ABNORMAL
EOSINOPHIL # BLD: 0 K/UL (ref 0–0.4)
EOSINOPHIL NFR BLD: 1 % (ref 0–7)
ERYTHROCYTE [DISTWIDTH] IN BLOOD BY AUTOMATED COUNT: 17.3 % (ref 11.5–14.5)
GLUCOSE BLD STRIP.AUTO-MCNC: 266 MG/DL (ref 65–100)
GLUCOSE BLD STRIP.AUTO-MCNC: 337 MG/DL (ref 65–100)
GLUCOSE SERPL-MCNC: 365 MG/DL (ref 65–100)
GRAM STN SPEC: NORMAL
GRAM STN SPEC: NORMAL
HCT VFR BLD AUTO: 24.2 % (ref 36.6–50.3)
HGB BLD-MCNC: 7.4 G/DL (ref 12.1–17)
IMM GRANULOCYTES # BLD AUTO: 0 K/UL (ref 0–0.04)
IMM GRANULOCYTES NFR BLD AUTO: 0 % (ref 0–0.5)
LACTATE SERPL-SCNC: 1.4 MMOL/L (ref 0.4–2)
LYMPHOCYTES # BLD: 0.6 K/UL (ref 0.8–3.5)
LYMPHOCYTES NFR BLD: 15 % (ref 12–49)
MCH RBC QN AUTO: 26.2 PG (ref 26–34)
MCHC RBC AUTO-ENTMCNC: 30.6 G/DL (ref 30–36.5)
MCV RBC AUTO: 85.8 FL (ref 80–99)
MONOCYTES # BLD: 0.5 K/UL (ref 0–1)
MONOCYTES NFR BLD: 12 % (ref 5–13)
NEUTS SEG # BLD: 2.7 K/UL (ref 1.8–8)
NEUTS SEG NFR BLD: 71 % (ref 32–75)
NRBC # BLD: 0 K/UL (ref 0–0.01)
NRBC BLD-RTO: 0 PER 100 WBC
O+P SPEC MICRO: NORMAL
O+P STL CONC: NORMAL
PLATELET # BLD AUTO: 102 K/UL (ref 150–400)
PLATELET COMMENTS,PCOM: ABNORMAL
PMV BLD AUTO: 11.2 FL (ref 8.9–12.9)
POTASSIUM SERPL-SCNC: 3.2 MMOL/L (ref 3.5–5.1)
RBC # BLD AUTO: 2.82 M/UL (ref 4.1–5.7)
RBC MORPH BLD: ABNORMAL
RBC MORPH BLD: ABNORMAL
SERVICE CMNT-IMP: ABNORMAL
SERVICE CMNT-IMP: ABNORMAL
SERVICE CMNT-IMP: NORMAL
SODIUM SERPL-SCNC: 137 MMOL/L (ref 136–145)
SPECIMEN EXP DATE BLD: NORMAL
SPECIMEN SOURCE: NORMAL
STATUS OF UNIT,%ST: NORMAL
UNIT DIVISION, %UDIV: 0
WBC # BLD AUTO: 3.8 K/UL (ref 4.1–11.1)

## 2021-01-13 PROCEDURE — 74011250636 HC RX REV CODE- 250/636: Performed by: INTERNAL MEDICINE

## 2021-01-13 PROCEDURE — 80048 BASIC METABOLIC PNL TOTAL CA: CPT

## 2021-01-13 PROCEDURE — 94003 VENT MGMT INPAT SUBQ DAY: CPT

## 2021-01-13 PROCEDURE — 5A1D70Z PERFORMANCE OF URINARY FILTRATION, INTERMITTENT, LESS THAN 6 HOURS PER DAY: ICD-10-PCS | Performed by: INTERNAL MEDICINE

## 2021-01-13 PROCEDURE — 82962 GLUCOSE BLOOD TEST: CPT

## 2021-01-13 PROCEDURE — 74011636637 HC RX REV CODE- 636/637: Performed by: INTERNAL MEDICINE

## 2021-01-13 PROCEDURE — 90935 HEMODIALYSIS ONE EVALUATION: CPT

## 2021-01-13 PROCEDURE — 74011250637 HC RX REV CODE- 250/637: Performed by: INTERNAL MEDICINE

## 2021-01-13 PROCEDURE — 85025 COMPLETE CBC W/AUTO DIFF WBC: CPT

## 2021-01-13 PROCEDURE — 94760 N-INVAS EAR/PLS OXIMETRY 1: CPT

## 2021-01-13 PROCEDURE — 83605 ASSAY OF LACTIC ACID: CPT

## 2021-01-13 PROCEDURE — 36415 COLL VENOUS BLD VENIPUNCTURE: CPT

## 2021-01-13 RX ORDER — ATORVASTATIN CALCIUM 40 MG/1
40 TABLET, FILM COATED ORAL DAILY
Status: DISCONTINUED | OUTPATIENT
Start: 2021-01-13 | End: 2021-01-13 | Stop reason: HOSPADM

## 2021-01-13 RX ORDER — DEXTROSE 50 % IN WATER (D50W) INTRAVENOUS SYRINGE
25-50 AS NEEDED
Status: DISCONTINUED | OUTPATIENT
Start: 2021-01-13 | End: 2021-01-13 | Stop reason: HOSPADM

## 2021-01-13 RX ORDER — GABAPENTIN 250 MG/5ML
100 SOLUTION ORAL EVERY 8 HOURS
Status: DISCONTINUED | OUTPATIENT
Start: 2021-01-13 | End: 2021-01-13 | Stop reason: HOSPADM

## 2021-01-13 RX ORDER — AMIODARONE HYDROCHLORIDE 200 MG/1
200 TABLET ORAL DAILY
Status: DISCONTINUED | OUTPATIENT
Start: 2021-01-13 | End: 2021-01-13 | Stop reason: HOSPADM

## 2021-01-13 RX ORDER — ESCITALOPRAM OXALATE 10 MG/1
10 TABLET ORAL DAILY
Status: DISCONTINUED | OUTPATIENT
Start: 2021-01-13 | End: 2021-01-13 | Stop reason: HOSPADM

## 2021-01-13 RX ORDER — INSULIN LISPRO 100 [IU]/ML
INJECTION, SOLUTION INTRAVENOUS; SUBCUTANEOUS EVERY 6 HOURS
Status: DISCONTINUED | OUTPATIENT
Start: 2021-01-13 | End: 2021-01-13 | Stop reason: HOSPADM

## 2021-01-13 RX ORDER — MAGNESIUM SULFATE 100 %
4 CRYSTALS MISCELLANEOUS AS NEEDED
Status: DISCONTINUED | OUTPATIENT
Start: 2021-01-13 | End: 2021-01-13 | Stop reason: HOSPADM

## 2021-01-13 RX ORDER — INSULIN GLARGINE 100 [IU]/ML
30 INJECTION, SOLUTION SUBCUTANEOUS EVERY 12 HOURS
Status: DISCONTINUED | OUTPATIENT
Start: 2021-01-13 | End: 2021-01-13 | Stop reason: HOSPADM

## 2021-01-13 RX ORDER — GABAPENTIN 250 MG/5ML
125 SOLUTION ORAL EVERY 8 HOURS
Status: DISCONTINUED | OUTPATIENT
Start: 2021-01-13 | End: 2021-01-13

## 2021-01-13 RX ADMIN — POTASSIUM BICARBONATE 40 MEQ: 782 TABLET, EFFERVESCENT ORAL at 09:21

## 2021-01-13 RX ADMIN — Medication 2 TABLET: at 09:21

## 2021-01-13 RX ADMIN — INSULIN GLARGINE 30 UNITS: 100 INJECTION, SOLUTION SUBCUTANEOUS at 12:37

## 2021-01-13 RX ADMIN — AMIODARONE HYDROCHLORIDE 200 MG: 200 TABLET ORAL at 09:21

## 2021-01-13 RX ADMIN — GABAPENTIN 100 MG: 250 SOLUTION ORAL at 15:31

## 2021-01-13 RX ADMIN — GABAPENTIN 125 MG: 250 SOLUTION ORAL at 09:35

## 2021-01-13 RX ADMIN — INSULIN LISPRO 7 UNITS: 100 INJECTION, SOLUTION INTRAVENOUS; SUBCUTANEOUS at 18:14

## 2021-01-13 RX ADMIN — HEPARIN SODIUM 3600 UNITS: 1000 INJECTION INTRAVENOUS; SUBCUTANEOUS at 13:31

## 2021-01-13 RX ADMIN — Medication 1 CAPSULE: at 09:21

## 2021-01-13 RX ADMIN — ACETAMINOPHEN 650 MG: 325 TABLET ORAL at 00:14

## 2021-01-13 RX ADMIN — OXYCODONE HYDROCHLORIDE AND ACETAMINOPHEN 1000 MG: 500 TABLET ORAL at 09:21

## 2021-01-13 RX ADMIN — APIXABAN 5 MG: 5 TABLET, FILM COATED ORAL at 09:21

## 2021-01-13 RX ADMIN — Medication 10 ML: at 15:32

## 2021-01-13 RX ADMIN — INSULIN LISPRO 5 UNITS: 100 INJECTION, SOLUTION INTRAVENOUS; SUBCUTANEOUS at 12:36

## 2021-01-13 RX ADMIN — CHLORHEXIDINE GLUCONATE 15 ML: 0.12 RINSE ORAL at 09:22

## 2021-01-13 RX ADMIN — ATORVASTATIN CALCIUM 40 MG: 40 TABLET, FILM COATED ORAL at 09:21

## 2021-01-13 RX ADMIN — HEPARIN SODIUM 5000 UNITS: 5000 INJECTION INTRAVENOUS; SUBCUTANEOUS at 04:23

## 2021-01-13 RX ADMIN — CLONAZEPAM 0.5 MG: 1 TABLET ORAL at 15:31

## 2021-01-13 RX ADMIN — ZINC SULFATE 220 MG (50 MG) CAPSULE 1 CAPSULE: CAPSULE at 09:21

## 2021-01-13 RX ADMIN — ESCITALOPRAM OXALATE 10 MG: 10 TABLET ORAL at 09:21

## 2021-01-13 RX ADMIN — LANSOPRAZOLE 30 MG: KIT at 06:42

## 2021-01-13 RX ADMIN — Medication 10 ML: at 06:43

## 2021-01-13 RX ADMIN — COLLAGENASE SANTYL: 250 OINTMENT TOPICAL at 09:23

## 2021-01-13 NOTE — PROGRESS NOTES
BRUCE  Patient medically stable for discharge to [facility] today after dialysis. Transport arranged through [facility] [phone] for ALS vent transport for 6:00 today, confirmed with Ida at  [facility]. Wife notified by [facility] liaison. Nursing report to be called to [phone] or to Nursing Supervisor at [phone]. Dr Xu Robin will be the accepting physician and report to be called to him at [phone].    Jocelin Alvarez RN,Care Management

## 2021-01-13 NOTE — PROGRESS NOTES
[facility]    ICU Team H&P     Name: Bony Alston   : 1955   MRN: [id number]   Date: 2021      This pts WBC has been and is normal now since his second hospital day. His first diff was almost all segs - I suspect stress demargination   His lactic  is < 2 and has been the entire admission, he is bright and at his baseline MS and has been for 2 days. He has been and is hemodynamically stable. He has mild candida in his urine without clumping. His blood cultures are negative. He does not appear toxic and hasn't since he woke up. His cultures are negative. He is not in danger of imminent death any more than he has been the last month His HCT has been stable, he has no signs of bleeding He received 1 unit of PRBC and repeat HCT are stable. He is getting 5 days of abx for a questionable UTI - with neg cultures save for mild candida which does not need to be treated. He does not meet sepsis criteria by any definition SEP 2 or the old definition relying on SIRS criteria. He is not critically ill or at high risk for deterioration anymore than any pt who has been through what he has for the last few months He is at his baseline seen at [address]    A/P   1. ? UTI  - 3 days ABX cefepime     2. DMII - SSI     3. CKI IV with LYDIA    A. Trend CR    B HD per nephrology     4. Chronic resp failure - now baseline     5. Anoxic brain injury - baseline     5. Afib - restart eilquis     6. SARS CoV-2 + - asymptomatic    A. VIT C, Vit D3   B. Zinc      7. Mild hypokalemia - repleted with 40 KCL this am     This pt can be transferred back to [facility] safely.        POD:* No surgery found *      Problem List  Never Reviewed          Codes Class    Sepsis ([facility] ICD-10-CM: A41.9  ICD-9-CM: 038.9, 995.91               Past Medical History:   Covid PNA with trach at [facility]   CKD IV   DMII  Afib  Debilitatin   Anoxic brain injury - chronic - able to communicate needs     Past Surgical History:      has no past surgical history on file. Home Medications:     Prior to Admission medications    Medication Sig Start Date End Date Taking? Authorizing Provider   polyvinyl alcohol-KITTY fitzgerald, (REFRESH CLASSIC) 1.4-0.6 % ophthalmic solution Administer 1 Drop to both eyes every six (6) hours as needed. Yes Provider, Historical   atorvastatin (LIPITOR) 40 mg tablet Take 40 mg by mouth nightly. Yes Provider, Historical   cholecalciferol (Vitamin D3) (1000 Units /25 mcg) tablet Take 2,000 Units by mouth daily. Yes Provider, Historical   gabapentin (NEURONTIN) 100 mg capsule Take 100 mg by mouth three (3) times daily. Yes Provider, Historical   b complex-vitamin c-folic acid (Nephronex) 900 mcg/5 mL liqd Take 5 mL by mouth nightly. Yes Provider, Historical   chlorhexidine (Paroex Oral Rinse) 0.12 % solution 15 mL by Swish and Spit route two (2) times daily as needed. Yes Provider, Historical   fluconazole (Diflucan) 100 mg tablet Take 100 mg by mouth daily. FDA advises cautious prescribing of oral fluconazole in pregnancy. Yes Provider, Historical   acetaminophen (TYLENOL) 325 mg tablet Take 650 mg by mouth every six (6) hours as needed for Pain. Yes Provider, Historical   amiodarone (CORDARONE) 200 mg tablet Take 200 mg by mouth two (2) times a day. Yes Provider, Historical   apixaban (ELIQUIS) 5 mg tablet Take 5 mg by mouth two (2) times a day. Yes Provider, Historical   clonazePAM (KlonoPIN) 0.5 mg tablet Take 0.5 mg by mouth every eight (8) hours. Yes Provider, Historical   albuterol-ipratropium (DUO-NEB) 2.5 mg-0.5 mg/3 ml nebu 3 mL by Nebulization route two (2) times a day. Yes Provider, Historical   midodrine (PROAMATINE) 10 mg tablet Take  by mouth Every Tuesday, Thursday and Saturday. Yes Provider, Historical   insulin lispro (HumaLOG U-100 Insulin) 100 unit/mL injection 1 Units by SubCUTAneous route three (3) times daily as needed (meals).    Yes Provider, Historical   pantoprazole (Protonix) 40 mg tablet Take 40 mg by mouth daily. Yes Provider, Historical   albuterol (PROVENTIL VENTOLIN) 2.5 mg /3 mL (0.083 %) nebu 2.5 mg by Nebulization route every six (6) hours as needed for Wheezing. Yes Provider, Historical   metoprolol tartrate (Lopressor) 100 mg IR tablet Take 100 mg by mouth two (2) times a day. Yes Provider, Historical   escitalopram oxalate (Lexapro) 10 mg tablet Take 10 mg by mouth daily. Yes Provider, Historical   darbepoetin chucho (Aranesp, Polysorbate,) 60 mcg/0.3 mL injection 60 mcg by SubCUTAneous route Every Thursday. Yes Provider, Historical   insulin glargine (Lantus U-100 Insulin) 100 unit/mL injection 30 Units by SubCUTAneous route every twelve (12) hours. Yes Provider, Historical       Allergies/Social/Family History: Allergies   Allergen Reactions    Codeine Nausea Only    Penciclovir Unknown (comments)      Social History     Tobacco Use    Smoking status: Not on file   Substance Use Topics    Alcohol use: Not on file      History reviewed. No pertinent family history. Review of Systems:     Unable     Objective:   Vital Signs:  Visit Vitals  /74   Pulse (!) 116   Temp 99.5 °F (37.5 °C)   Resp 20   Ht 5' 10\" (1.778 m)   Wt 90.1 kg (198 lb 10.2 oz)   SpO2 100%   BMI 28.50 kg/m²      O2 Device: Tracheostomy Temp (24hrs), Av.4 °F (37.4 °C), Min:98.2 °F (36.8 °C), Max:100.7 °F (38.2 °C)           Intake/Output:     Intake/Output Summary (Last 24 hours) at 2021 0806  Last data filed at 2021 0700  Gross per 24 hour   Intake 3075 ml   Output 330 ml   Net 2745 ml       Physical Exam:    General:  Baseline    Eyes:  Sclera anicteric. Pupils equal, round, reactive to light. Mouth/Throat: Orotracheal tube in place. Neck: Supple. Lungs:   Clear to auscultation bilaterally, good effort. Cardiovascular:  Regular rate and rhythm, no murmur, click, rub, or gallop. Abdomen:   Soft, non-tender, bowel sounds normal, non-distended. Extremities: No cyanosis or edema. Skin: No acute rash or lesions. Lymph Nodes: Cervical and supraclavicular normal.   Musculoskeletal:  No swelling or deformity. Lines/Devices:  Intact, no erythema, drainage, or tenderness. LABS AND  DATA: Personally reviewed  Recent Labs     01/13/21  0437 01/12/21  1846   WBC 3.8* 4.2   HGB 7.4* 7.7*   HCT 24.2* 24.7*   * 100*     Recent Labs     01/13/21  0437 01/12/21  0236    134*   K 3.2* 4.7    104   CO2 23 21   BUN 53* 88*   CREA 3.86* 5.85*   * 115*   CA 8.9 9.2   MG  --  2.5*   PHOS  --  3.6     Recent Labs     01/11/21  0652   AP 87   TP 6.2*   ALB 2.6*   GLOB 3.6     No results for input(s): INR, PTP, APTT, INREXT, INREXT in the last 72 hours. Recent Labs     01/11/21  1004   PHI 7.26*   PCO2I 46.8*   PO2I 45*   FIO2I 0.35     No results for input(s): CPK, CKMB, TROIQ, BNPP in the last 72 hours. Hemodynamics:   PAP:   CO:     Wedge:   CI:     CVP:    SVR:       PVR:       Ventilator Settings:  Mode Rate Tidal Volume Pressure FiO2 PEEP   Assist control   500 ml    35 % 8 cm H20     Peak airway pressure: 29 cm H2O    Minute ventilation: 12 l/min        MEDS: Reviewed    Chest X-Ray: personally reviewed and report checked      DISPOSITION  To [facility]    CRITICAL CARE     I personally spent 35 minutes of critical care time. This is time spent at this critically ill patient's bedside actively involved in patient care as well as the coordination of care and discussions with the patient's family. This does not include any procedural time which has been billed separately.     [address] [place] [address]  1/13/2021

## 2021-01-13 NOTE — PROGRESS NOTES
Name: Mayte Zhang MRN: [id number]   : 1955 Hospital: [address]   Date: 2021        IMPRESSION:   · ESRD on HD. Patient is seen during HD in the presence of acute [facility]  · Hypokalemia  · Respiratory failure- remains on vent  · Critical anemia with iron deficiency- Hb is much  Better. · COVID + pneumonia      PLAN:   · Complete a full HD Tx today, change the Rx to K- 4.0  · Anemia management- Venofer and Epogen  · Patient seems to be improved. Subjective/Interval History:   I have reviewed the flowsheet and previous days notes. ROS:Review of systems not obtained due to patient factors. Objective:   Vital Signs:    Visit Vitals  /65   Pulse (!) 130   Temp 99.9 °F (37.7 °C)   Resp 19   Ht 5' 10\" (1.778 m)   Wt 90.1 kg (198 lb 10.2 oz)   SpO2 100%   BMI 28.50 kg/m²       O2 Device: Tracheostomy, Ventilator       Temp (24hrs), Av.5 °F (37.5 °C), Min:98.2 °F (36.8 °C), Max:100.7 °F (38.2 °C)       Intake/Output:   Last shift:       07 - 1900  In: 678.8 [I.V.:178.8]  Out: 220 [Urine:120; Drains:100]  Last 3 shifts: 1901 -  0700  In: 4880 [I.V.:2975]  Out: 1980 [Urine:330; Drains:150]    Intake/Output Summary (Last 24 hours) at 2021 1254  Last data filed at 2021 1200  Gross per 24 hour   Intake 3053.75 ml   Output 545 ml   Net 2508.75 ml        Physical Exam:  General:    Lethargic, on vent. HD is in progress. Head:   Normocephalic, without obvious abnormality, atraumatic. Eyes:   Closed  Nose:  Nares normal. No drainage or sinus tenderness. Throat:    Intubated. Neck:  Supple. Lungs:   Diminished to auscultation bilaterally. + Rhonchi. Chest wall:  No tenderness or deformity. No Accessory muscle use. Heart:   Regular rate and rhythm,  no murmur, rub or gallop. Abdomen:   Soft, non-tender. Not distended. Bowel sounds normal. No masses  Extremities: Extremities normal, atraumatic, No cyanosis. No edema.  No clubbing  Skin:     Not Jaundiced  Psych:  Not able to assess. Neurologic: Does not move spontaneously      DATA:  Labs:  Recent Labs     01/13/21 0437 01/12/21  0236 01/11/21  0652    134* 133*   K 3.2* 4.7 5.0    104 103   CO2 23 21 22   BUN 53* 88* 76*   CREA 3.86* 5.85* 5.29*   CA 8.9 9.2 9.3   ALB  --   --  2.6*   PHOS  --  3.6  --    MG  --  2.5*  --      Recent Labs     01/13/21 0437 01/12/21  1846 01/12/21 0236   WBC 3.8* 4.2 5.7   HGB 7.4* 7.7* 6.8*   HCT 24.2* 24.7* 22.9*   * 100* 120*     No results for input(s): LOREE, KU, CLU, CREAU in the last 72 hours.     No lab exists for component: PROU    Total time spent with patient:  35 minutes    [] Critical Care Provided    Care Plan discussed with:   Staff, Medical Team    Anjana Tinajero MD

## 2021-01-13 NOTE — ACP (ADVANCE CARE PLANNING)
Advance Care Planning     Advance Care Planning Activator (Inpatient)  Conversation Note      Date of ACP Conversation: 01/13/21     Conversation Conducted with:    ACP Activator: Damion Harp RN      If no [employer] listed above or available through scanned documents, then:    If no Authorized Decision Maker has previously been identified, then patient chooses [employer]:  \"Who would you like to name as your primary health care decision-maker? \"    Name: Deliliah Seip  Relationship:wife Phone number: [phone]  Adal Harvey this person be reached easily? \" Yes  \"Who would you like to name as your back-up decision maker? \" Stephanie Vallejo   Relationship:Daughter Phone number: [phone]  Adal Harvey this person be reached easily? \"Yes  Care Preferences    Ventilation: \"If you were in your present state of health and suddenly became very ill and were unable to breathe on your own, what would your preference be about the use of a ventilator (breathing machine) if it were available to you? \"  Yes      \"If your health worsens and it becomes clear that your chance of recovery is unlikely, what would your preference be about the use of a ventilator (breathing machine) if it were available to you? \" Unknown      Resuscitation  \"CPR works best to restart the heart when there is a sudden event, like a heart attack, in someone who is otherwise healthy. Unfortunately, CPR does not typically restart the heart for people who have serious health conditions or who are very sick. \"    \"In the event your heart stopped as a result of an underlying serious health condition, would you want attempts to be made to restart your heart Yes      [] Yes  [x] No   Educated Patient / Decision Maker regarding differences between Advance Directives and portable DNR orders.     Length of ACP Conversation in minutes:      Conversation Outcomes:  [x] ACP discussion completed  [] Existing advance directive reviewed with patient; no changes to patient's previously recorded wishes     [] New Advance Directive completed   [] Portable Do Not Resuscitate prepared for Provider review and signature  [] POLST/POST/MOLST/MOST prepared for Provider review and signature      Follow-up plan:    [] Schedule follow-up conversation to continue planning  [] Referred individual to Provider for additional questions/concerns   [] Advised patient/agent/surrogate to review completed ACP document and update if needed with changes in condition, patient preferences or care setting     [] This note routed to one or more involved healthcare providers

## 2021-01-13 NOTE — PROCEDURES
[facility]\"" Dialysis Team [facility]  [phone]    Vitals   Pre   Post   Assessment   Pre   Post     Temp  Temp: 99.6 °F (37.6 °C) (01/13/21 0950) 99.3 LOC  Awake, alert. Non-verbal remains alert   HR   Pulse (Heart Rate): (!) 126 (01/13/21 0950) 135 Lungs   Trached/vented.  Remains vented   B/P  BP: (!) 145/77 (01/13/21 0950) 132/77 Cardiac   Tachycardic, irregular HR 120s-130s   Resp   Resp Rate: (!) 31 (01/13/21 0950) 33 Skin   Warm and dry No change   Pain level  Pain Intensity 1: 0 (01/13/21 0800)  Edema  BLE 1+ pitting +1   Orders:    Duration:   Start:  0950 End:  1350 Total: 4 hrs   Dialyzer:   Dialyzer/Set Up Inspection: Revaclear (01/12/21 0245)   K Bath:   Dialysate K (mEq/L): 2 (01/13/21 0950)   Ca Bath:   Dialysate CA (mEq/L): 2.0 (01/13/21 0950)   Na/Bicarb:   Dialysate NA (mEq/L): 148 (01/13/21 0950)   Target Fluid Removal:   Goal/Amount of Fluid to Remove (mL): 2000 mL (01/13/21 0950)   Access     Type & Location:   RIJ tunneled CVC- transparent dsg dated 01/12/21, CDI. +aspiration/flush.  as ordered with ideal AP/   Labs     Obtained/Reviewed   Critical Results Called   Date when labs were drawn-  Hgb-    HGB   Date Value Ref Range Status   01/13/2021 7.4 (L) 12.1 - 17.0 g/dL Final     K-    Potassium   Date Value Ref Range Status   01/13/2021 3.2 (L) 3.5 - 5.1 mmol/L Final     Comment:     INVESTIGATED PER DELTA CHECK PROTOCOL     Ca-   Calcium   Date Value Ref Range Status   01/13/2021 8.9 8.5 - 10.1 MG/DL Final     Bun-   BUN   Date Value Ref Range Status   01/13/2021 53 (H) 6 - 20 MG/DL Final     Comment:     INVESTIGATED PER DELTA CHECK PROTOCOL     Creat-   Creatinine   Date Value Ref Range Status   01/13/2021 3.86 (H) 0.70 - 1.30 MG/DL Final     Comment:     INVESTIGATED PER DELTA CHECK PROTOCOL      Medications/ Blood Products Given     Name   Dose   Route and Time     heparin 3600 units  Arterial dwell 1.8ml, venous dwell 1.8ml             Blood Volume Processed (BVP):  86.4L Net Fluid   Removed: 2000 mL   Comments   Time Out Done: 0945  Primary Nurse Rpt Pre: BYRON Sommer RN  Primary Nurse Rpt Post: BYRON Sommer RN  Pt Education: CVC precautions  Care Plan: discharge to [facility] after HD today  Tx Summary:  0945: Safety checks complete, time out performed. 7557: CVC assessed, no redness, warmth or drainage noted. Transparent dressing and biopatch CDI. Each catheter limb disinfected for 60 seconds per limb with alcohol swabs. Caps removed, dialysis CVC hub scrubbed with [employer]ics for 15 seconds, followed by a 5 second dry time per Hospital P&P. Aspirated and discarded 5ml from each lumen. +aspiration/flush. HD initiated. Access visible, lines secure. Medications reviewed. 1100: Switched to a 4K bath  1250: L. Hussain Oh MD at bedside to discuss plan of care. 1350: HD complete. All possible blood rinsed back. Each catheter limb disinfected for 60 seconds per limb with alcohol swabs. Dialysis CVC hubs scrubbed with Prevantics for 15 seconds, followed by a 5 second dry time per Hospital P&P. Saline flushed, locked and capped. SBAR called to primary RN.     Admitting Diagnosis: fever, AMS, LYDIA on CKD   Pt's previous clinic: n/a  Informed Consent Verified: Yes (01/13/21 0950)   Hepatitis Status: HBsAg: Neg (1/12/21) obtained from [facility] Number: [id number] (01/13/21 0950)   Telemetry status: bedside cardiac monitor  Pre-Dialysis Weight: 90.1 kg (198 lb 10.2 oz) (01/13/21 0950)

## 2021-01-13 NOTE — DISCHARGE SUMMARY
[facility]                                                                                         Discharge Summary     Patient: Mayte Zhang       MRN: [id number]       YOB: 1955       Age: 76 y.o. Date of admission:  1/10/2021    Date of discharge:  1/13/2021    Primary care provider:  Popeye Ibrahim MD     Admitting provider:  Kenia Salter MD    Discharging provider(s): Kenia Salter MD - Staff [address]     Consultations  · IP CONSULT TO NEPHROLOGY    Procedures  · EGD x 2     Discharge destination: [facility]. The patient is stable for discharge. Admission diagnosis  · Sepsis ([facility] [A41.9]    Please refer to the admission history and physical for details on the presenting problem. Final discharge diagnoses and brief hospital course     1. ?UTI  - 3 days ABX cefepime now d/c      2. DMII - SSI      3. CKI IV with LYDIA               A. Trend CR               B HD per nephrology      4. Chronic resp failure - now baseline      5. Anoxic brain injury - baseline      6. Afib       7. SARS CoV-2 + - asymptomatic               A. VIT C, Vit D3              B. Zinc    8. Anemia - transfused 1 unit PRBC     Hospital course  Pt admitted from [address] [place] [address] for decreased mentation, mild acute on chronic resp acidosis, ? Mild UTI Placed on AC mode on his vent and given ABX By the second day he was at his baseline status, This pts WBC has been normal since his second hospital day. His first diff was almost all segs - I suspect stress demargination   His lactic  < 2 and has been the entire admission, he is bright and at his baseline MS and has been for 2 days. He has been and is hemodynamically stable. He has mild candida in his urine without clumping. His blood cultures are negative. He does not appear toxic and hasn't since he woke up. His cultures are negative.  He is not in danger of imminent death any more than he has been the last month His HCT has been stable after receiving 1 unit PRBCs , he has no signs of bleeding repeat HCT are stable. He got 3/3 days of abx for a questionable UTI - with neg cultures save for mild candida which does not need to be treated. He does not meet sepsis criteria by any definition SEP 2 or the old definition relying on SIRS criteria. He is not critically ill or at high risk for deterioration anymore than any pt who has been through what he has for the last few months He is at his baseline seen at [facility] for transfer back [facility]      Physical examination at discharge  Visit Vitals  BP (!) 116/59 (BP 1 Location: Right arm, BP Patient Position: At rest)   Pulse (!) 115   Temp 99 °F (37.2 °C)   Resp 17   Ht 5' 10\" (1.778 m)   Wt 90.1 kg (198 lb 10.2 oz)   SpO2 100%   BMI 28.50 kg/m²        Recent Labs     01/13/21 0437 01/12/21  1846 01/12/21  0236   WBC 3.8* 4.2 5.7   HGB 7.4* 7.7* 6.8*   HCT 24.2* 24.7* 22.9*   * 100* 120*     Recent Labs     01/13/21  0437 01/12/21  0236 01/11/21  0652    134* 133*   K 3.2* 4.7 5.0    104 103   CO2 23 21 22   BUN 53* 88* 76*   CREA 3.86* 5.85* 5.29*   * 115* 122*   CA 8.9 9.2 9.3   MG  --  2.5*  --    PHOS  --  3.6  --      Recent Labs     01/11/21  0652   AP 87   TP 6.2*   ALB 2.6*   GLOB 3.6     No results for input(s): INR, PTP, APTT, INREXT in the last 72 hours. Recent Labs     01/12/21  0236   TIBC 204*   PSAT 6*      No results for input(s): PH, PCO2, PO2 in the last 72 hours. No results for input(s): CPK, CKMB in the last 72 hours. No lab exists for component: TROPONINI  No components found for: [place]    ---------------------------------    Chronic Diagnoses:    Problem List as of 1/13/2021 Never Reviewed          Codes Class Noted - Resolved    Sepsis ([facility] ICD-10-CM: A41.9  ICD-9-CM: 038.9, 995.91  1/10/2021 - Present              Time spent on discharge related activities today greater than 30 minutes.       Signed:      Mary Kuhn MD   Staff [facility]    1/13/2021   5:31 PM   Attending        Cc: Marielena Grajeda MD

## 2021-01-13 NOTE — PROGRESS NOTES
1930: Bedside and Verbal shift change report given to [address] (oncoming nurse) by Sunny Perez RN (offgoing nurse). Report included the following information SBAR, Kardex, ED Summary, MAR, Recent Results, and Cardiac Rhythm sinus tach .

## 2021-01-13 NOTE — PROGRESS NOTES
0730: Bedside and Verbal shift change report given to ERICKA Chao (oncoming nurse) by Rayna Acosta RN (offgoing nurse). Report included the following information SBAR, Kardex, Intake/Output, MAR, Recent Results, Cardiac Rhythm ST and Alarm Parameters . 1600: Transfer out report called to [place], LPN at [facility][phone][facility] using SBAR. Opportunity to ask questions provided. Anticipated transfer  at 1800.     1920: Handoff report given to [facility] transport nurse.

## 2021-01-15 LAB
ARTERIAL PATENCY WRIST A: YES
BACTERIA SPEC CULT: NORMAL
BACTERIA SPEC CULT: NORMAL
BASE EXCESS BLDA CALC-SCNC: 5.1 MMOL/L
BDY SITE: ABNORMAL
COHGB MFR BLD: 0.4 % (ref 1–2)
FIO2 ON VENT: 55 %
GAS FLOW.O2 SETTING OXYMISER: 16 L/MIN
HCO3 BLDA-SCNC: 28 MMOL/L (ref 22–26)
HGB BLD OXIMETRY-MCNC: 11 G/DL (ref 14–17)
METHGB MFR BLD: 0.1 % (ref 0–1.4)
OXYHGB MFR BLD: 93.8 % (ref 94–97)
PCO2 BLDA: 36 MMHG (ref 35–45)
PEEP RESPIRATORY: 5 CM[H2O]
PH BLDA: 7.52 [PH] (ref 7.35–7.45)
PO2 BLDA: 68 MMHG (ref 80–100)
SAO2 % BLD: 94 % (ref 95–99)
SAO2% DEVICE SAO2% SENSOR NAME: ABNORMAL
SERVICE CMNT-IMP: NORMAL
SERVICE CMNT-IMP: NORMAL
SPECIMEN SITE: ABNORMAL
VENTILATION MODE VENT: ABNORMAL
VT SETTING VENT: 450 ML

## 2021-12-03 NOTE — PROGRESS NOTES
Name: Bety Gomez MRN: [id number]   : 1955 [facility]   Date: 2021        IMPRESSION:   · ESRD on HD. Patient was dialyzed on Monday  · Respiratory failure- remains on vent  · Critical anemia with iron deficiency  · COVID + pneumonia      PLAN:   · Next HD in AM  · Anemia management- Venofer and Epogen  · Critically ill, at very high risk for deterioration and death     Subjective/Interval History:   I have reviewed the flowsheet and previous days notes. ROS:Review of systems not obtained due to patient factors. Objective:   Vital Signs:    Visit Vitals  BP (!) 116/51   Pulse (!) 127   Temp 99.7 °F (37.6 °C)   Resp 20   Ht 5' 10\" (1.778 m)   Wt 87.7 kg (193 lb 5.5 oz)   SpO2 98%   BMI 27.74 kg/m²       O2 Device: Tracheostomy, Ventilator       Temp (24hrs), Av.6 °F (37.6 °C), Min:99.1 °F (37.3 °C), Max:99.9 °F (37.7 °C)       Intake/Output:   Last shift:      No intake/output data recorded. Last 3 shifts: 01/10 1901 -  0700  In: 2330 [I.V.:1775]  Out: 2550 [Urine:700; Drains:350]    Intake/Output Summary (Last 24 hours) at 2021 0943  Last data filed at 2021 0630  Gross per 24 hour   Intake 2180 ml   Output 1800 ml   Net 380 ml        Physical Exam:  General:    Lethargic, on vent  Head:   Normocephalic, without obvious abnormality, atraumatic. Eyes:   Closed  Nose:  Nares normal. No drainage or sinus tenderness. Throat:    Lips, mucosa, and tongue normal.    Neck:  Trach in place  Lungs:   Diminished to auscultation bilaterally. + Rhonchi. Chest wall:  No tenderness or deformity. No Accessory muscle use. Heart:   Regular rate and rhythm,  no murmur, rub or gallop. Abdomen:   Soft, non-tender. Not distended. Bowel sounds normal. No masses  Extremities: Extremities normal, atraumatic, No cyanosis. No edema. No clubbing  Skin:     Not Jaundiced  Psych:  Not able to assess.   Neurologic: Does not move spontaneously      DATA:  Labs:  Recent Labs 01/12/21  0236 01/11/21  0652 01/10/21  0717   * 133* 128*   K 4.7 5.0 5.1    103 97   CO2 21 22 26   BUN 88* 76* 53*   CREA 5.85* 5.29* 4.48*   CA 9.2 9.3 9.7   ALB  --  2.6* 3.2*   PHOS 3.6  --   --    MG 2.5*  --   --      Recent Labs     01/12/21  0236 01/11/21  0652 01/10/21  0717   WBC 5.7 6.1 13.0*   HGB 6.8* 7.2* 8.7*   HCT 22.9* 24.5* 29.5*   * 130* 222     No results for input(s): LOREE, KU, CLU, CREAU in the last 72 hours.     No lab exists for component: PROU    Total time spent with patient:  35 minutes    [] Critical Care Provided    Care Plan discussed with:   Staff, Medical Team    Sesar Marcos MD None

## 2022-02-02 NOTE — ED PROVIDER NOTES
Mr. Yesenia Emanuel is a 70yo male who presents to the ER with altered mental status from [address] [place] [address].  The patient is unable to give a history. History is obtained through nursing report and I spoke to the physician. Apparently, he has been more unresponsive the last day or 2 and normal.  The physician says that his pH was 7.2 and his PCO2 was elevated. They were worried that he was retaining CO2. He has a trach collar in place. He is at 20%. He was not switched to the vent nor any settings change that were reported from the facility. Patient apparently has an anoxic brain injury as result of a coded secondary to COVID-19. This was from October 2020. No other complaints reported      Patient's history is limited by his baseline mental status. History reviewed. No pertinent past medical history. No past surgical history on file. History reviewed. No pertinent family history.     Social History     Socioeconomic History    Marital status: Not on file     Spouse name: Not on file    Number of children: Not on file    Years of education: Not on file    Highest education level: Not on file   Occupational History    Not on file   Social Needs    Financial resource strain: Not on file    Food insecurity     Worry: Not on file     Inability: Not on file    Transportation needs     Medical: Not on file     Non-medical: Not on file   Tobacco Use    Smoking status: Not on file   Substance and Sexual Activity    Alcohol use: Not on file    Drug use: Not on file    Sexual activity: Not on file   Lifestyle    Physical activity     Days per week: Not on file     Minutes per session: Not on file    Stress: Not on file   Relationships    Social connections     Talks on phone: Not on file     Gets together: Not on file     Attends [employer] service: Not on file     Active member of club or organization: Not on file     Attends meetings of clubs or organizations: Not on file Relationship status: Not on file    Intimate partner violence     Fear of current or ex partner: Not on file     Emotionally abused: Not on file     Physically abused: Not on file     Forced sexual activity: Not on file   Other Topics Concern    Not on file   Social History Narrative    Not on file         ALLERGIES: Patient has no known allergies. Review of Systems   Unable to perform ROS: Mental status change   Skin: Negative for pallor and rash. All other systems reviewed and are negative. Vitals:    01/10/21 0625   BP: (!) 149/62   Pulse: (!) 109   Resp: 18   SpO2: 99%            Physical Exam     Vital signs reviewed. Nursing notes reviewed. Const:  Laying back in bed  Head:  Atraumatic, normocephalic  Eyes:  PERRL, conjunctiva normal, no scleral icterus  Neck:  Trach collar in place  Cardiovascular: tachycardic   Resp:  Moderate resp distress, increased work of breathing  Abd:  Soft, non-tender, non-distended  MSK:  No pedal edema, normal ROM  Neuro:  No response to pain, not alert  Skin:  Warm, dry, intact  Psych: unresponsive      MDM  Number of Diagnoses or Management Options     Amount and/or Complexity of Data Reviewed  Clinical lab tests: ordered and reviewed  Tests in the radiology section of CPT®: ordered and reviewed  Review and summarize past medical records: yes    Patient Progress  Patient progress: stable          Mr. Maria Dolores Wilson is a 70yo male who presents to the ER with AMS. Pt. Found to have AMS with CO2 retention. Pt. Also found to have a UTI. He was placed on the vent. Pt. To be admitted by the ICU team for further care.       Procedures 21-May-2017

## 2022-03-20 PROBLEM — A41.9 SEPSIS (HCC): Status: ACTIVE | Noted: 2021-01-10

## 2023-05-17 RX ORDER — AMIODARONE HYDROCHLORIDE 200 MG/1
200 TABLET ORAL 2 TIMES DAILY
COMMUNITY

## 2023-05-17 RX ORDER — PANTOPRAZOLE SODIUM 40 MG/1
40 TABLET, DELAYED RELEASE ORAL DAILY
COMMUNITY

## 2023-05-17 RX ORDER — CHLORHEXIDINE GLUCONATE 0.12 MG/ML
15 RINSE ORAL 2 TIMES DAILY PRN
COMMUNITY

## 2023-05-17 RX ORDER — CLONAZEPAM 0.5 MG/1
0.5 TABLET ORAL EVERY 8 HOURS
COMMUNITY

## 2023-05-17 RX ORDER — ACETAMINOPHEN 325 MG/1
650 TABLET ORAL EVERY 6 HOURS PRN
COMMUNITY

## 2023-05-17 RX ORDER — ATORVASTATIN CALCIUM 40 MG/1
40 TABLET, FILM COATED ORAL NIGHTLY
COMMUNITY

## 2023-05-17 RX ORDER — GABAPENTIN 100 MG/1
100 CAPSULE ORAL 3 TIMES DAILY
COMMUNITY

## 2023-05-17 RX ORDER — MIDODRINE HYDROCHLORIDE 10 MG/1
TABLET ORAL
COMMUNITY

## 2023-05-17 RX ORDER — FLUCONAZOLE 100 MG/1
100 TABLET ORAL DAILY
COMMUNITY

## 2023-05-17 RX ORDER — INSULIN GLARGINE 100 [IU]/ML
30 INJECTION, SOLUTION SUBCUTANEOUS EVERY 12 HOURS
COMMUNITY

## 2023-05-17 RX ORDER — IPRATROPIUM BROMIDE AND ALBUTEROL SULFATE 2.5; .5 MG/3ML; MG/3ML
3 SOLUTION RESPIRATORY (INHALATION) 2 TIMES DAILY
COMMUNITY

## 2023-05-17 RX ORDER — ESCITALOPRAM OXALATE 10 MG/1
10 TABLET ORAL DAILY
COMMUNITY

## 2023-05-17 RX ORDER — ALBUTEROL SULFATE 2.5 MG/3ML
2.5 SOLUTION RESPIRATORY (INHALATION) EVERY 6 HOURS PRN
COMMUNITY

## 2023-05-17 RX ORDER — INSULIN LISPRO 100 [IU]/ML
1 INJECTION, SOLUTION INTRAVENOUS; SUBCUTANEOUS 3 TIMES DAILY PRN
COMMUNITY

## 2023-05-17 RX ORDER — DARBEPOETIN ALFA 60 UG/.3ML
60 INJECTION, SOLUTION INTRAVENOUS; SUBCUTANEOUS
COMMUNITY

## 2023-05-17 RX ORDER — METOPROLOL TARTRATE 100 MG/1
100 TABLET ORAL 2 TIMES DAILY
COMMUNITY